# Patient Record
Sex: MALE | Race: WHITE | Employment: OTHER | URBAN - METROPOLITAN AREA
[De-identification: names, ages, dates, MRNs, and addresses within clinical notes are randomized per-mention and may not be internally consistent; named-entity substitution may affect disease eponyms.]

---

## 2018-03-07 ENCOUNTER — OFFICE VISIT (OUTPATIENT)
Dept: FAMILY MEDICINE CLINIC | Age: 58
End: 2018-03-07

## 2018-03-07 ENCOUNTER — HOSPITAL ENCOUNTER (OUTPATIENT)
Dept: GENERAL RADIOLOGY | Age: 58
Discharge: HOME | End: 2018-03-07
Attending: NURSE PRACTITIONER | Admitting: NURSE PRACTITIONER

## 2018-03-07 VITALS
HEIGHT: 73 IN | HEART RATE: 76 BPM | BODY MASS INDEX: 41.75 KG/M2 | WEIGHT: 315 LBS | OXYGEN SATURATION: 96 % | DIASTOLIC BLOOD PRESSURE: 84 MMHG | SYSTOLIC BLOOD PRESSURE: 138 MMHG | RESPIRATION RATE: 17 BRPM

## 2018-03-07 DIAGNOSIS — M79.604 RIGHT LEG PAIN: Primary | ICD-10-CM

## 2018-03-07 DIAGNOSIS — Z23 IMMUNIZATION DUE: ICD-10-CM

## 2018-03-07 DIAGNOSIS — H00.019 HORDEOLUM EXTERNUM, UNSPECIFIED LATERALITY: ICD-10-CM

## 2018-03-07 DIAGNOSIS — M79.604 RIGHT LEG PAIN: ICD-10-CM

## 2018-03-07 PROCEDURE — G8417 CALC BMI ABV UP PARAM F/U: HCPCS | Performed by: NURSE PRACTITIONER

## 2018-03-07 PROCEDURE — G8510 SCR DEP NEG, NO PLAN REQD: HCPCS | Performed by: NURSE PRACTITIONER

## 2018-03-07 PROCEDURE — G8427 DOCREV CUR MEDS BY ELIG CLIN: HCPCS | Performed by: NURSE PRACTITIONER

## 2018-03-07 PROCEDURE — G8484 FLU IMMUNIZE NO ADMIN: HCPCS | Performed by: NURSE PRACTITIONER

## 2018-03-07 PROCEDURE — 3017F COLORECTAL CA SCREEN DOC REV: CPT | Performed by: NURSE PRACTITIONER

## 2018-03-07 PROCEDURE — 90715 TDAP VACCINE 7 YRS/> IM: CPT | Performed by: NURSE PRACTITIONER

## 2018-03-07 PROCEDURE — 90471 IMMUNIZATION ADMIN: CPT | Performed by: NURSE PRACTITIONER

## 2018-03-07 PROCEDURE — 1036F TOBACCO NON-USER: CPT | Performed by: NURSE PRACTITIONER

## 2018-03-07 PROCEDURE — 99214 OFFICE O/P EST MOD 30 MIN: CPT | Performed by: NURSE PRACTITIONER

## 2018-03-07 RX ORDER — BACITRACIN ZINC AND POLYMYXIN B SULFATE 500; 10000 [USP'U]/G; [USP'U]/G
0.5 OINTMENT OPHTHALMIC 2 TIMES DAILY
Qty: 3.5 G | Refills: 0 | Status: SHIPPED | OUTPATIENT
Start: 2018-03-07 | End: 2018-03-17

## 2018-03-07 ASSESSMENT — ENCOUNTER SYMPTOMS
RESPIRATORY NEGATIVE: 1
PHOTOPHOBIA: 1
EYE REDNESS: 1
EYE PAIN: 1
EYE ITCHING: 1

## 2018-03-07 ASSESSMENT — PATIENT HEALTH QUESTIONNAIRE - PHQ9
1. LITTLE INTEREST OR PLEASURE IN DOING THINGS: 0
SUM OF ALL RESPONSES TO PHQ9 QUESTIONS 1 & 2: 0
2. FEELING DOWN, DEPRESSED OR HOPELESS: 0
SUM OF ALL RESPONSES TO PHQ QUESTIONS 1-9: 0

## 2018-03-07 NOTE — PATIENT INSTRUCTIONS
We are committed to providing you the best care possible. If you receive a survey after visiting one of our offices, please take time to share your experience concerning your physician office visit. These surveys are confidential and no health information about you is shared. We are eager to improve for you and we are counting on your feedback to help make that happen.         You have an order to get an xray of your lower right leg; we will call you with the results    Keep your leg wound clean and dry    Ace wrap may help with swelling, use otc aleve as needed for comfort, ice and elevation for reduction of swelling and decreased pain    Your medication has been sent to the pharmacy; please return for new or worsening symptoms or any concerns as needed

## 2018-03-27 ENCOUNTER — OFFICE VISIT (OUTPATIENT)
Dept: FAMILY MEDICINE CLINIC | Age: 58
End: 2018-03-27

## 2018-03-27 VITALS
BODY MASS INDEX: 41.75 KG/M2 | OXYGEN SATURATION: 94 % | SYSTOLIC BLOOD PRESSURE: 140 MMHG | DIASTOLIC BLOOD PRESSURE: 100 MMHG | RESPIRATION RATE: 14 BRPM | WEIGHT: 315 LBS | HEIGHT: 73 IN | HEART RATE: 86 BPM

## 2018-03-27 DIAGNOSIS — E66.01 MORBID OBESITY WITH BMI OF 40.0-44.9, ADULT (HCC): ICD-10-CM

## 2018-03-27 DIAGNOSIS — F51.04 PSYCHOPHYSIOLOGICAL INSOMNIA: ICD-10-CM

## 2018-03-27 DIAGNOSIS — I10 ESSENTIAL HYPERTENSION: Primary | ICD-10-CM

## 2018-03-27 DIAGNOSIS — R73.09 ABNORMAL GLUCOSE: ICD-10-CM

## 2018-03-27 DIAGNOSIS — J98.01 POST-INFECTION BRONCHOSPASM: ICD-10-CM

## 2018-03-27 LAB
A/G RATIO: 1.4 (ref 1.1–2.2)
ALBUMIN SERPL-MCNC: 4.3 G/DL (ref 3.4–5)
ALP BLD-CCNC: 80 U/L (ref 40–129)
ALT SERPL-CCNC: 34 U/L (ref 10–40)
ANION GAP SERPL CALCULATED.3IONS-SCNC: 15 MMOL/L (ref 3–16)
AST SERPL-CCNC: 26 U/L (ref 15–37)
BILIRUB SERPL-MCNC: 0.5 MG/DL (ref 0–1)
BUN BLDV-MCNC: 10 MG/DL (ref 7–20)
CALCIUM SERPL-MCNC: 8.6 MG/DL (ref 8.3–10.6)
CHLORIDE BLD-SCNC: 103 MMOL/L (ref 99–110)
CHOLESTEROL, TOTAL: 119 MG/DL (ref 0–199)
CO2: 24 MMOL/L (ref 21–32)
CREAT SERPL-MCNC: 0.6 MG/DL (ref 0.9–1.3)
GFR AFRICAN AMERICAN: >60
GFR NON-AFRICAN AMERICAN: >60
GLOBULIN: 3 G/DL
GLUCOSE BLD-MCNC: 94 MG/DL (ref 70–99)
HCT VFR BLD CALC: 42 % (ref 40.5–52.5)
HDLC SERPL-MCNC: 46 MG/DL (ref 40–60)
HEMOGLOBIN: 14.5 G/DL (ref 13.5–17.5)
LDL CHOLESTEROL CALCULATED: 40 MG/DL
MCH RBC QN AUTO: 31.2 PG (ref 26–34)
MCHC RBC AUTO-ENTMCNC: 34.6 G/DL (ref 31–36)
MCV RBC AUTO: 90.4 FL (ref 80–100)
PDW BLD-RTO: 13.6 % (ref 12.4–15.4)
PLATELET # BLD: 246 K/UL (ref 135–450)
PMV BLD AUTO: 7.8 FL (ref 5–10.5)
POTASSIUM SERPL-SCNC: 4.2 MMOL/L (ref 3.5–5.1)
RBC # BLD: 4.65 M/UL (ref 4.2–5.9)
SODIUM BLD-SCNC: 142 MMOL/L (ref 136–145)
TOTAL PROTEIN: 7.3 G/DL (ref 6.4–8.2)
TRIGL SERPL-MCNC: 163 MG/DL (ref 0–150)
TSH SERPL DL<=0.05 MIU/L-ACNC: 2.34 UIU/ML (ref 0.27–4.2)
VLDLC SERPL CALC-MCNC: 33 MG/DL
WBC # BLD: 7 K/UL (ref 4–11)

## 2018-03-27 PROCEDURE — G8427 DOCREV CUR MEDS BY ELIG CLIN: HCPCS | Performed by: FAMILY MEDICINE

## 2018-03-27 PROCEDURE — G8417 CALC BMI ABV UP PARAM F/U: HCPCS | Performed by: FAMILY MEDICINE

## 2018-03-27 PROCEDURE — 36415 COLL VENOUS BLD VENIPUNCTURE: CPT | Performed by: FAMILY MEDICINE

## 2018-03-27 PROCEDURE — 1036F TOBACCO NON-USER: CPT | Performed by: FAMILY MEDICINE

## 2018-03-27 PROCEDURE — 3017F COLORECTAL CA SCREEN DOC REV: CPT | Performed by: FAMILY MEDICINE

## 2018-03-27 PROCEDURE — G8484 FLU IMMUNIZE NO ADMIN: HCPCS | Performed by: FAMILY MEDICINE

## 2018-03-27 PROCEDURE — 99214 OFFICE O/P EST MOD 30 MIN: CPT | Performed by: FAMILY MEDICINE

## 2018-03-27 RX ORDER — LISINOPRIL 2.5 MG/1
2.5 TABLET ORAL NIGHTLY
Qty: 30 TABLET | Refills: 3 | Status: SHIPPED | OUTPATIENT
Start: 2018-03-27 | End: 2018-05-08 | Stop reason: SDUPTHER

## 2018-03-27 NOTE — PROGRESS NOTES
03/27/18 (!) 140/100   03/07/18 138/84   12/12/16 134/88     Wt Readings from Last 3 Encounters:   03/27/18 (!) 319 lb (144.7 kg)   03/07/18 (!) 331 lb 12.8 oz (150.5 kg)   12/12/16 299 lb (135.6 kg)     Constitutional: He is oriented to person, place, and time. He appears well-developed and well-nourished. No distress. morbid  obese habitus. Wearing dark glasses. Here with wife. Uses cane  HENT: wearing bilateral hearing aids and   Head: Normocephalic and atraumatic. Nose: Nose normal.  Mouth/Throat: Oropharynx is clear and moist.   Neck: Trachea normal. Neck supple. Cardiovascular: Normal rate, regular rhythm, S1 normal, S2 normal and normal heart sounds. No murmur heard. Pulmonary/Chest: Breath sounds normal. No accessory muscle usage. No respiratory distress. He  has no decreased breath sounds. He  has no wheezes. Abdominal: Soft. Normal appearance and bowel sounds are normal. . No tenderness. He has no CVA tenderness. Neurological: He  is alert and oriented to person, place, and time. Skin: He  is not diaphoretic. Psychiatric: He  has a normal mood and affect. MSK: trace non pitting edema to both shins.      Assessment and Plan:  See above

## 2018-03-28 LAB
ESTIMATED AVERAGE GLUCOSE: 108.3 MG/DL
HBA1C MFR BLD: 5.4 %

## 2018-05-08 ENCOUNTER — OFFICE VISIT (OUTPATIENT)
Dept: FAMILY MEDICINE CLINIC | Age: 58
End: 2018-05-08

## 2018-05-08 VITALS
OXYGEN SATURATION: 95 % | SYSTOLIC BLOOD PRESSURE: 138 MMHG | WEIGHT: 315 LBS | HEIGHT: 73 IN | DIASTOLIC BLOOD PRESSURE: 86 MMHG | HEART RATE: 77 BPM | RESPIRATION RATE: 16 BRPM | BODY MASS INDEX: 41.75 KG/M2

## 2018-05-08 DIAGNOSIS — R45.4 IRRITABILITY: ICD-10-CM

## 2018-05-08 DIAGNOSIS — I10 ESSENTIAL HYPERTENSION: Primary | ICD-10-CM

## 2018-05-08 PROCEDURE — 3017F COLORECTAL CA SCREEN DOC REV: CPT | Performed by: FAMILY MEDICINE

## 2018-05-08 PROCEDURE — 99213 OFFICE O/P EST LOW 20 MIN: CPT | Performed by: FAMILY MEDICINE

## 2018-05-08 PROCEDURE — 1036F TOBACCO NON-USER: CPT | Performed by: FAMILY MEDICINE

## 2018-05-08 PROCEDURE — G8417 CALC BMI ABV UP PARAM F/U: HCPCS | Performed by: FAMILY MEDICINE

## 2018-05-08 PROCEDURE — G8427 DOCREV CUR MEDS BY ELIG CLIN: HCPCS | Performed by: FAMILY MEDICINE

## 2018-05-08 RX ORDER — LISINOPRIL 2.5 MG/1
2.5 TABLET ORAL DAILY
Qty: 90 TABLET | Refills: 3 | Status: SHIPPED | OUTPATIENT
Start: 2018-05-08 | End: 2018-08-07 | Stop reason: SDUPTHER

## 2018-05-08 RX ORDER — ESCITALOPRAM OXALATE 5 MG/1
5 TABLET ORAL DAILY
Qty: 90 TABLET | Refills: 1 | Status: SHIPPED | OUTPATIENT
Start: 2018-05-08 | End: 2018-08-07 | Stop reason: SDUPTHER

## 2018-06-08 ENCOUNTER — TELEPHONE (OUTPATIENT)
Dept: FAMILY MEDICINE CLINIC | Age: 58
End: 2018-06-08

## 2018-08-07 ENCOUNTER — OFFICE VISIT (OUTPATIENT)
Dept: FAMILY MEDICINE CLINIC | Age: 58
End: 2018-08-07

## 2018-08-07 VITALS
BODY MASS INDEX: 42.09 KG/M2 | HEART RATE: 82 BPM | SYSTOLIC BLOOD PRESSURE: 138 MMHG | DIASTOLIC BLOOD PRESSURE: 100 MMHG | WEIGHT: 315 LBS | OXYGEN SATURATION: 98 %

## 2018-08-07 DIAGNOSIS — R68.82 DECREASED LIBIDO: ICD-10-CM

## 2018-08-07 DIAGNOSIS — I10 ESSENTIAL HYPERTENSION: Primary | ICD-10-CM

## 2018-08-07 DIAGNOSIS — R45.4 IRRITABILITY: ICD-10-CM

## 2018-08-07 PROCEDURE — 3017F COLORECTAL CA SCREEN DOC REV: CPT | Performed by: FAMILY MEDICINE

## 2018-08-07 PROCEDURE — 1036F TOBACCO NON-USER: CPT | Performed by: FAMILY MEDICINE

## 2018-08-07 PROCEDURE — G8417 CALC BMI ABV UP PARAM F/U: HCPCS | Performed by: FAMILY MEDICINE

## 2018-08-07 PROCEDURE — G8427 DOCREV CUR MEDS BY ELIG CLIN: HCPCS | Performed by: FAMILY MEDICINE

## 2018-08-07 PROCEDURE — 99213 OFFICE O/P EST LOW 20 MIN: CPT | Performed by: FAMILY MEDICINE

## 2018-08-07 RX ORDER — LISINOPRIL 10 MG/1
10 TABLET ORAL DAILY
Qty: 90 TABLET | Refills: 1 | Status: SHIPPED | OUTPATIENT
Start: 2018-08-07 | End: 2019-01-14 | Stop reason: SDUPTHER

## 2018-08-07 RX ORDER — ESCITALOPRAM OXALATE 10 MG/1
10 TABLET ORAL DAILY
Qty: 90 TABLET | Refills: 1 | Status: SHIPPED | OUTPATIENT
Start: 2018-08-07 | End: 2019-01-14 | Stop reason: SDUPTHER

## 2018-08-07 NOTE — PROGRESS NOTES
Plan:   Increase lisinopril to 10mg  Increase lexapro to 5 mg  Patient declines med for ED at this time         Diagnosis Assessment and Associated Orders:     Diagnosis Orders   1. Essential hypertension  lisinopril (PRINIVIL;ZESTRIL) 10 MG tablet   2. Irritability  escitalopram (LEXAPRO) 10 MG tablet   3. Decreased libido          All patient questions answered. Pt voiced understanding. Return in about 3 months (around 11/7/2018) for f/u moods and blood pressure.  -----------------------------------------------------------------------------------------------            Chief Complaint   Patient presents with    3 Month Follow-Up     Wife states he has been having issues with twitching, states that it happens during the night or when he is relaxed completely. He ias been doing lots of physical work moving furniture and reports symptoms started after that activity. Has improved over the last few days. No other tremors reported. Blood pressure is still high at home and tolerating lisinopril without side effects. Patient denies any exertional chest pain, dyspnea, palpitations, syncope, orthopnea, edema or paroxysmal nocturnal dyspnea. The patient denies cough, chest pain, dyspnea, wheezing or hemoptysis. Moods improved with some mild irritability and still grieving the loss of his father earlier this year. + side effects with decreased libido since starting the lexparo. Denies any suicidial or homicidal ideation or hallucinations. PHQ Scores 3/7/2018   PHQ2 Score 0   PHQ9 Score 0     Interpretation of Total Score Depression Severity: 1-4 = Minimal depression, 5-9 = Mild depression, 10-14 = Moderate depression, 15-19 = Moderately severe depression, 20-27 = Severe depression      ROS: Pertinent items are noted in HPI. All other ROS negative     reports that he has never smoked. He has never used smokeless tobacco.   reports that he drinks alcohol.     Physical Exam   Nursing note reviewed  BP (!)

## 2019-01-14 ENCOUNTER — OFFICE VISIT (OUTPATIENT)
Dept: FAMILY MEDICINE CLINIC | Age: 59
End: 2019-01-14
Payer: MEDICARE

## 2019-01-14 VITALS
OXYGEN SATURATION: 98 % | HEIGHT: 73 IN | DIASTOLIC BLOOD PRESSURE: 86 MMHG | BODY MASS INDEX: 41.75 KG/M2 | WEIGHT: 315 LBS | RESPIRATION RATE: 14 BRPM | HEART RATE: 83 BPM | SYSTOLIC BLOOD PRESSURE: 134 MMHG

## 2019-01-14 DIAGNOSIS — H54.8 LEGAL BLINDNESS: ICD-10-CM

## 2019-01-14 DIAGNOSIS — E66.01 MORBID OBESITY WITH BMI OF 40.0-44.9, ADULT (HCC): ICD-10-CM

## 2019-01-14 DIAGNOSIS — R45.4 IRRITABILITY: ICD-10-CM

## 2019-01-14 DIAGNOSIS — I10 ESSENTIAL HYPERTENSION: ICD-10-CM

## 2019-01-14 DIAGNOSIS — Z78.9 ALCOHOL USE: ICD-10-CM

## 2019-01-14 DIAGNOSIS — H35.52 RETINITIS PIGMENTOSA: ICD-10-CM

## 2019-01-14 DIAGNOSIS — M54.2 MUSCLE PAIN, CERVICAL: Primary | ICD-10-CM

## 2019-01-14 PROBLEM — F10.90 ALCOHOL USE: Status: ACTIVE | Noted: 2019-01-14

## 2019-01-14 PROCEDURE — 99214 OFFICE O/P EST MOD 30 MIN: CPT | Performed by: FAMILY MEDICINE

## 2019-01-14 RX ORDER — TIZANIDINE 4 MG/1
4 TABLET ORAL NIGHTLY PRN
Qty: 40 TABLET | Refills: 1 | Status: SHIPPED | OUTPATIENT
Start: 2019-01-14 | End: 2022-01-26 | Stop reason: SDUPTHER

## 2019-01-14 RX ORDER — LISINOPRIL 10 MG/1
10 TABLET ORAL DAILY
Qty: 90 TABLET | Refills: 1 | Status: SHIPPED | OUTPATIENT
Start: 2019-01-14 | End: 2019-10-31 | Stop reason: SDUPTHER

## 2019-01-14 RX ORDER — ESCITALOPRAM OXALATE 10 MG/1
10 TABLET ORAL DAILY
Qty: 90 TABLET | Refills: 1 | Status: SHIPPED | OUTPATIENT
Start: 2019-01-14 | End: 2020-02-04 | Stop reason: SDUPTHER

## 2019-03-19 ENCOUNTER — TELEPHONE (OUTPATIENT)
Dept: FAMILY MEDICINE CLINIC | Age: 59
End: 2019-03-19

## 2019-10-31 ENCOUNTER — OFFICE VISIT (OUTPATIENT)
Dept: FAMILY MEDICINE CLINIC | Age: 59
End: 2019-10-31
Payer: MEDICARE

## 2019-10-31 VITALS
BODY MASS INDEX: 41.64 KG/M2 | OXYGEN SATURATION: 97 % | HEIGHT: 73 IN | DIASTOLIC BLOOD PRESSURE: 84 MMHG | RESPIRATION RATE: 16 BRPM | SYSTOLIC BLOOD PRESSURE: 136 MMHG | HEART RATE: 88 BPM | WEIGHT: 314.2 LBS

## 2019-10-31 DIAGNOSIS — R45.4 IRRITABILITY: ICD-10-CM

## 2019-10-31 DIAGNOSIS — M54.10 RADICULAR SYNDROME OF RIGHT LOWER EXTREMITY: ICD-10-CM

## 2019-10-31 DIAGNOSIS — I10 ESSENTIAL HYPERTENSION: Primary | ICD-10-CM

## 2019-10-31 DIAGNOSIS — Z78.9 ALCOHOL USE: ICD-10-CM

## 2019-10-31 PROCEDURE — 99214 OFFICE O/P EST MOD 30 MIN: CPT | Performed by: FAMILY MEDICINE

## 2019-10-31 PROCEDURE — 36415 COLL VENOUS BLD VENIPUNCTURE: CPT | Performed by: FAMILY MEDICINE

## 2019-10-31 RX ORDER — LISINOPRIL 10 MG/1
10 TABLET ORAL DAILY
Qty: 90 TABLET | Refills: 3 | Status: SHIPPED | OUTPATIENT
Start: 2019-10-31 | End: 2020-02-04 | Stop reason: SDUPTHER

## 2019-10-31 RX ORDER — BLOOD PRESSURE TEST KIT
KIT MISCELLANEOUS
Qty: 1 KIT | Refills: 0 | Status: SHIPPED | OUTPATIENT
Start: 2019-10-31

## 2019-10-31 ASSESSMENT — PATIENT HEALTH QUESTIONNAIRE - PHQ9
1. LITTLE INTEREST OR PLEASURE IN DOING THINGS: 0
SUM OF ALL RESPONSES TO PHQ QUESTIONS 1-9: 0
SUM OF ALL RESPONSES TO PHQ QUESTIONS 1-9: 0
SUM OF ALL RESPONSES TO PHQ9 QUESTIONS 1 & 2: 0
2. FEELING DOWN, DEPRESSED OR HOPELESS: 0

## 2019-11-01 LAB
A/G RATIO: 1.6 (ref 1.1–2.2)
ALBUMIN SERPL-MCNC: 4.4 G/DL (ref 3.4–5)
ALP BLD-CCNC: 75 U/L (ref 40–129)
ALT SERPL-CCNC: 24 U/L (ref 10–40)
ANION GAP SERPL CALCULATED.3IONS-SCNC: 18 MMOL/L (ref 3–16)
AST SERPL-CCNC: 19 U/L (ref 15–37)
BILIRUB SERPL-MCNC: 0.5 MG/DL (ref 0–1)
BUN BLDV-MCNC: 18 MG/DL (ref 7–20)
CALCIUM SERPL-MCNC: 8.9 MG/DL (ref 8.3–10.6)
CHLORIDE BLD-SCNC: 102 MMOL/L (ref 99–110)
CO2: 21 MMOL/L (ref 21–32)
CREAT SERPL-MCNC: 0.8 MG/DL (ref 0.9–1.3)
GFR AFRICAN AMERICAN: >60
GFR NON-AFRICAN AMERICAN: >60
GLOBULIN: 2.8 G/DL
GLUCOSE BLD-MCNC: 92 MG/DL (ref 70–99)
HCT VFR BLD CALC: 43 % (ref 40.5–52.5)
HEMOGLOBIN: 14.2 G/DL (ref 13.5–17.5)
MCH RBC QN AUTO: 32 PG (ref 26–34)
MCHC RBC AUTO-ENTMCNC: 33.1 G/DL (ref 31–36)
MCV RBC AUTO: 96.7 FL (ref 80–100)
PDW BLD-RTO: 13.5 % (ref 12.4–15.4)
PLATELET # BLD: 189 K/UL (ref 135–450)
PMV BLD AUTO: 8 FL (ref 5–10.5)
POTASSIUM SERPL-SCNC: 4.4 MMOL/L (ref 3.5–5.1)
RBC # BLD: 4.45 M/UL (ref 4.2–5.9)
SODIUM BLD-SCNC: 141 MMOL/L (ref 136–145)
TOTAL PROTEIN: 7.2 G/DL (ref 6.4–8.2)
WBC # BLD: 7.4 K/UL (ref 4–11)

## 2020-02-03 NOTE — PROGRESS NOTES
non antalgic gati     Assessment and Plan:  See above     Diagnosis Orders   1. Essential hypertension - improved. continue lisinopril and home monitoring. lisinopril (PRINIVIL;ZESTRIL) 10 MG tablet   2. Irritability - improved with lexapro  escitalopram (LEXAPRO) 10 MG tablet   3. Screen for colon cancer Patient was given FIT testing instructions and kit today by staff. The patient understands the implications of the testing to screen for colorectal cancer and agree to do the testing in a timely manner and will consider getting the full colonoscopy if the FIT testing is positive or develop any GI/bowel symptoms.     POCT Fecal Immunochemical Test (FIT)     Return in about 6 months (around 8/4/2020) for Med refills- moods, HTN .        ----------------------------------------------------------------------------

## 2020-02-04 ENCOUNTER — OFFICE VISIT (OUTPATIENT)
Dept: FAMILY MEDICINE CLINIC | Age: 60
End: 2020-02-04
Payer: MEDICARE

## 2020-02-04 VITALS
DIASTOLIC BLOOD PRESSURE: 86 MMHG | WEIGHT: 315 LBS | BODY MASS INDEX: 41.75 KG/M2 | RESPIRATION RATE: 16 BRPM | HEIGHT: 73 IN | HEART RATE: 87 BPM | SYSTOLIC BLOOD PRESSURE: 136 MMHG | OXYGEN SATURATION: 98 %

## 2020-02-04 PROCEDURE — 99213 OFFICE O/P EST LOW 20 MIN: CPT | Performed by: FAMILY MEDICINE

## 2020-02-04 RX ORDER — ESCITALOPRAM OXALATE 10 MG/1
10 TABLET ORAL DAILY
Qty: 90 TABLET | Refills: 3 | Status: SHIPPED | OUTPATIENT
Start: 2020-02-04 | End: 2020-10-20

## 2020-02-04 RX ORDER — LISINOPRIL 10 MG/1
10 TABLET ORAL DAILY
Qty: 90 TABLET | Refills: 3 | Status: SHIPPED | OUTPATIENT
Start: 2020-02-04 | End: 2021-03-16

## 2020-02-04 ASSESSMENT — PATIENT HEALTH QUESTIONNAIRE - PHQ9
SUM OF ALL RESPONSES TO PHQ QUESTIONS 1-9: 0
1. LITTLE INTEREST OR PLEASURE IN DOING THINGS: 0
2. FEELING DOWN, DEPRESSED OR HOPELESS: 0
SUM OF ALL RESPONSES TO PHQ9 QUESTIONS 1 & 2: 0
SUM OF ALL RESPONSES TO PHQ QUESTIONS 1-9: 0

## 2020-02-04 NOTE — LETTER
February 4, 2020     Patient: Susan Barrera   YOB: 1960   Date of Visit: 2/4/2020       To Whom It May Concern: It is my medical opinion that Shravan Altman requires a constant caregiver to monitor his current medical conditions. He has ongoing chronic severe vision issues that require regular monitoring. .    If you have any questions or concerns, please don't hesitate to call. Sincerely,          Prasanth Neal M.D., M.P.H  [address] [place] [address] [address]  [facility], [address] [facility]  Phone: [phone]  Fax: [phone]

## 2020-03-25 ENCOUNTER — TELEPHONE (OUTPATIENT)
Dept: FAMILY MEDICINE CLINIC | Age: 60
End: 2020-03-25

## 2020-03-27 RX ORDER — HYDROXYZINE HYDROCHLORIDE 10 MG/1
10 TABLET, FILM COATED ORAL EVERY 8 HOURS PRN
Qty: 30 TABLET | Refills: 1 | Status: SHIPPED | OUTPATIENT
Start: 2020-03-27 | End: 2020-04-06

## 2020-03-27 NOTE — TELEPHONE ENCOUNTER
notify that doubling his dose may not help as it takes 2 weeks to increase the level in the system. I sent to his [facility], hydroxyzine tablets to use for anxiety flares. It can cause drowsiness and dry mouth as it is stronger version of over the counter benadryl. There is no addiction or withdrawal risk with this medication.

## 2020-08-04 ENCOUNTER — VIRTUAL VISIT (OUTPATIENT)
Dept: FAMILY MEDICINE CLINIC | Age: 60
End: 2020-08-04
Payer: MEDICARE

## 2020-08-04 PROCEDURE — 99442 PR PHYS/QHP TELEPHONE EVALUATION 11-20 MIN: CPT | Performed by: FAMILY MEDICINE

## 2020-08-13 ENCOUNTER — TELEPHONE (OUTPATIENT)
Dept: FAMILY MEDICINE CLINIC | Age: 60
End: 2020-08-13

## 2020-10-20 ENCOUNTER — VIRTUAL VISIT (OUTPATIENT)
Dept: FAMILY MEDICINE CLINIC | Age: 60
End: 2020-10-20
Payer: MEDICARE

## 2020-10-20 PROBLEM — F12.90 MARIJUANA USE: Status: ACTIVE | Noted: 2020-10-20

## 2020-10-20 PROBLEM — F41.9 ANXIETY: Status: ACTIVE | Noted: 2020-10-20

## 2020-10-20 PROCEDURE — 99442 PR PHYS/QHP TELEPHONE EVALUATION 11-20 MIN: CPT | Performed by: FAMILY MEDICINE

## 2020-10-20 NOTE — PROGRESS NOTES
Prudy Opitz is a 64 y.o. male evaluated via telephone on 10/20/2020. Consent:  He and/or health care decision maker is aware that that he may receive a bill for this telephone service, depending on his insurance coverage, and has provided verbal consent to proceed: Yes      Documentation:  I communicated with the patient and/or health care decision maker about     1. Anxiety- patient reports that lexapro 10 mg may not be working as well as it used to. Being anxious being out of the house and had severe anxiety when being out with visiting friends a few weeks ago. Patient wanting to possibly use medical marijuana as he tried his friends supply. Denies any suicidial or homicidal ideation or hallucinations. Encouraged patient to keep cut down on alcohol use to help with anxiety. Details of this discussion including any medical advice provided:     1. Patient has already stopped lexapro 3 weeks ago    2. He will plan to go to medical marijuana clinic to get his medical card to use marijuana for his anxiety instead. 3. Counseled on quitting alcohol. 4.  Patient to keep Andrey appt as scheduled. Diagnosis Orders   1. Anxiety     2. Marijuana use         I affirm this is a Patient Initiated Episode with a Patient who has not had a related appointment within my department in the past 7 days or scheduled within the next 24 hours. Diagnosis Orders   1. Anxiety     2.  Marijuana use         Patient identification was verified at the start of the visit: Yes    Total Time: minutes: 11-20 minutes    Note: not billable if this call serves to triage the patient into an appointment for the relevant concern      [facility]

## 2021-03-13 DIAGNOSIS — I10 ESSENTIAL HYPERTENSION: ICD-10-CM

## 2021-03-16 RX ORDER — LISINOPRIL 10 MG/1
10 TABLET ORAL DAILY
Qty: 90 TABLET | Refills: 0 | Status: SHIPPED | OUTPATIENT
Start: 2021-03-16

## 2022-01-20 ENCOUNTER — VIRTUAL VISIT (OUTPATIENT)
Dept: FAMILY MEDICINE CLINIC | Age: 62
End: 2022-01-20
Payer: MEDICARE

## 2022-01-20 DIAGNOSIS — I10 ESSENTIAL HYPERTENSION: ICD-10-CM

## 2022-01-20 DIAGNOSIS — E78.2 MIXED HYPERLIPIDEMIA: ICD-10-CM

## 2022-01-20 DIAGNOSIS — J02.0 ACUTE STREPTOCOCCAL PHARYNGITIS: Primary | ICD-10-CM

## 2022-01-20 PROCEDURE — 99214 OFFICE O/P EST MOD 30 MIN: CPT | Performed by: NURSE PRACTITIONER

## 2022-01-20 RX ORDER — LISINOPRIL 10 MG/1
10 TABLET ORAL DAILY
Qty: 90 TABLET | Refills: 0 | Status: CANCELLED | OUTPATIENT
Start: 2022-01-20

## 2022-01-20 RX ORDER — AMOXICILLIN 500 MG/1
500 CAPSULE ORAL 2 TIMES DAILY
Qty: 20 CAPSULE | Refills: 0 | Status: SHIPPED | OUTPATIENT
Start: 2022-01-20 | End: 2022-01-30

## 2022-01-25 ENCOUNTER — TELEPHONE (OUTPATIENT)
Dept: FAMILY MEDICINE CLINIC | Age: 62
End: 2022-01-25

## 2022-01-25 DIAGNOSIS — R39.12 WEAK URINARY STREAM: Primary | ICD-10-CM

## 2022-01-25 DIAGNOSIS — M54.2 MUSCLE PAIN, CERVICAL: ICD-10-CM

## 2022-01-25 NOTE — TELEPHONE ENCOUNTER
----- Message from Santana  sent at 1/25/2022  9:52 AM EST -----  Subject: Message to Provider    QUESTIONS  Information for Provider? Pt wife called in and stated he is having issues   with urinating. She says that when he goes to the bathroom, a very little   amount will come out each time. She didn't know if he needed to be seen   for this or if Dr. Papo Paz wanted to order a urine test. Please advise.  ---------------------------------------------------------------------------  --------------  CALL BACK INFO  What is the best way for the office to contact you? OK to leave message on   voicemail  Preferred Call Back Phone Number? [phone]  ---------------------------------------------------------------------------  --------------  SCRIPT ANSWERS  Relationship to Patient? Other  Representative Name? Malu Dia  Is the Representative on the appropriate HIPAA document in Epic?  Yes

## 2022-01-26 RX ORDER — TAMSULOSIN HYDROCHLORIDE 0.4 MG/1
0.4 CAPSULE ORAL
Qty: 30 CAPSULE | Refills: 5 | Status: SHIPPED | OUTPATIENT
Start: 2022-01-26

## 2022-01-26 RX ORDER — TIZANIDINE 4 MG/1
4 TABLET ORAL NIGHTLY PRN
Qty: 40 TABLET | Refills: 1 | Status: SHIPPED | OUTPATIENT
Start: 2022-01-26

## 2022-01-26 NOTE — TELEPHONE ENCOUNTER
Please notify patient should  1. Limit ibuprofen 200mg tablets-  3 tablets with food every 12 hours as needed. 2. Can add tyelnol 500mg or 650mg tablets- 2 tablet every 8 hours with food. 3. I sent refill of muscle relaxer if he thinks the headaches are due to neck spasms or upper back spasms.

## 2022-01-26 NOTE — TELEPHONE ENCOUNTER
Please notify patient to continue antibiotics given in ER last night for a UTI and I sent generic Flomax to help increase the urinary stream as he probably has an enlarged prostate.   He should use it for at least two weeks but also hydrate adequately with at least 50 ounces non caffienated fluid per day

## 2022-01-26 NOTE — TELEPHONE ENCOUNTER
Patient spouse called and stated that he is having headaches with no relief from 200's 2 daily iburphon, stated that he has been sleeping most of the day due to the headaches. Would like to know if she could up the dose or would provider like to prescribe some medication.

## 2022-04-05 ENCOUNTER — TELEPHONE (OUTPATIENT)
Dept: FAMILY MEDICINE CLINIC | Age: 62
End: 2022-04-05

## 2022-04-05 NOTE — TELEPHONE ENCOUNTER
Last appointment 1/20/22. Patient would like a script for a handicap placard. please advise, thank you.

## 2022-04-05 NOTE — LETTER
[facility]  [address] [place] [address] [facility]  Phone: [phone]  Fax: [phone]      April 5, 2022     Patient: Lluvia Ovalles   YOB: 1960   Date of Last Visit: 1/20/2022       To Whom It May Concern: It is my medical opinion that Brian Leger requires a disability parking placard for the following reasons:  He is blind. Duration of need: 2 years    If you have any questions or concerns, please don't hesitate to call. Sincerely,          Beth Antony M.D., M.P.H  [address] [place] [address].  [address] [place] [address] [address]  [facility], [address] [facility]  Phone: [phone]  Fax: [phone]

## 2022-10-03 ENCOUNTER — COMMUNITY OUTREACH (OUTPATIENT)
Dept: FAMILY MEDICINE CLINIC | Age: 62
End: 2022-10-03

## 2023-04-18 ENCOUNTER — TELEPHONE (OUTPATIENT)
Dept: FAMILY MEDICINE CLINIC | Age: 63
End: 2023-04-18

## 2023-07-11 PROBLEM — H54.10 BLINDNESS AND LOW VISION: Status: ACTIVE | Noted: 2023-07-11

## 2023-07-11 PROBLEM — H54.7 VISUAL IMPAIRMENT: Status: ACTIVE | Noted: 2023-07-11

## 2023-09-26 ENCOUNTER — OFFICE VISIT (OUTPATIENT)
Dept: FAMILY MEDICINE CLINIC | Age: 63
End: 2023-09-26
Payer: MEDICARE

## 2023-09-26 VITALS
OXYGEN SATURATION: 96 % | BODY MASS INDEX: 43.41 KG/M2 | DIASTOLIC BLOOD PRESSURE: 92 MMHG | SYSTOLIC BLOOD PRESSURE: 142 MMHG | HEART RATE: 94 BPM | WEIGHT: 315 LBS

## 2023-09-26 DIAGNOSIS — E88.810 METABOLIC SYNDROME: ICD-10-CM

## 2023-09-26 DIAGNOSIS — M47.26 OSTEOARTHRITIS OF SPINE WITH RADICULOPATHY, LUMBAR REGION: ICD-10-CM

## 2023-09-26 DIAGNOSIS — M54.16 LUMBAR RADICULOPATHY, CHRONIC: Primary | ICD-10-CM

## 2023-09-26 PROCEDURE — 99213 OFFICE O/P EST LOW 20 MIN: CPT | Performed by: FAMILY MEDICINE

## 2023-09-26 PROCEDURE — 3077F SYST BP >= 140 MM HG: CPT | Performed by: FAMILY MEDICINE

## 2023-09-26 PROCEDURE — 3080F DIAST BP >= 90 MM HG: CPT | Performed by: FAMILY MEDICINE

## 2023-09-26 RX ORDER — PREDNISONE 50 MG/1
50 TABLET ORAL DAILY
Qty: 5 TABLET | Refills: 0 | COMMUNITY
Start: 2023-09-24 | End: 2023-09-29

## 2023-09-26 RX ORDER — METHOCARBAMOL 500 MG/1
500 TABLET, FILM COATED ORAL 4 TIMES DAILY
Qty: 20 TABLET | Refills: 0 | COMMUNITY
Start: 2023-09-24 | End: 2023-09-29

## 2023-09-26 RX ORDER — MELOXICAM 15 MG/1
15 TABLET ORAL DAILY
Qty: 10 TABLET | Refills: 0 | COMMUNITY
Start: 2023-09-24 | End: 2023-09-26 | Stop reason: ALTCHOICE

## 2023-09-26 RX ORDER — LIDOCAINE 50 MG/G
1 PATCH TOPICAL
COMMUNITY
Start: 2023-09-24

## 2023-09-26 NOTE — PROGRESS NOTES
Plan:         Diagnosis Orders   1. Lumbar radiculopathy, chronic  [facility] [place]    MRI LUMBAR SPINE WO CONTRAST      2. Osteoarthritis of spine with radiculopathy, lumbar region  [address]      3. Metabolic syndrome  metFORMIN (GLUCOPHAGE) 500 MG tablet      Orders as above. Patient to stop nsaids due to other med conditions. Finish prednisone. Muscle relaxer prn. Consider cymbalta for long term. Trial metformin. Check labs in a few weeks. Low carb health eating to avoid any weight gain, which will worsen back pain. Referal pain specialist/ spine specialist based on insurance network and MRI results. All patient questions answered. Pt voiced understanding. Return for Keep upcoming appointment in this office. -----------------------------------------------------------------------------------------------            Chief Complaint   Patient presents with    ED Follow-up     Back pain pt reports some improvement but pain is still present. Pt seen in ER Sunday and reports was unable to walk due to the pain. pt describes pain as what he imagines it would feel like if he were shot in the back with a gun. Pt recalls no specific mechanism of injury, pt dose recall back pain issues began in May after a hiking trip. Painful daily and worse with prolong walking, sitting or standing without position changes. Still has tingling in back radiating to both legs and outer thighs, right worse than left. On prednisone, muscle relaxer and meloxicam from ER. Patient also would like med to help with weight loss. H/o prediabetes, low HDL and large waste circumference. + l;egally blind so physical activity is limited beyond regular ADL's wife supporting cooking, transporation, and housework, dressing          ROS: Pertinent items are noted in HPI. All other ROS negative     reports that he has never smoked.  He has never used smokeless tobacco.      Physical

## 2023-09-29 ENCOUNTER — HOSPITAL ENCOUNTER (OUTPATIENT)
Dept: MRI IMAGING | Age: 63
Discharge: HOME | End: 2023-09-29
Attending: FAMILY MEDICINE
Payer: MEDICARE

## 2023-09-29 ENCOUNTER — TELEPHONE (OUTPATIENT)
Dept: FAMILY MEDICINE CLINIC | Age: 63
End: 2023-09-29

## 2023-09-29 DIAGNOSIS — M54.16 LUMBAR RADICULOPATHY, CHRONIC: ICD-10-CM

## 2023-09-29 PROCEDURE — 72148 MRI LUMBAR SPINE W/O DYE: CPT

## 2023-09-29 NOTE — TELEPHONE ENCOUNTER
Omayra Cleary from [facility] , called and wanted to let the provider know that patient had his MRI completed. But at this time insurance will not approve due to that patient has not had a peer to peer. Reference #[id number], Insurance phone number .

## 2023-10-04 ENCOUNTER — TELEPHONE (OUTPATIENT)
Dept: FAMILY MEDICINE CLINIC | Age: 63
End: 2023-10-04

## 2023-10-04 DIAGNOSIS — M54.16 LUMBAR RADICULOPATHY, CHRONIC: Primary | ICD-10-CM

## 2023-10-05 DIAGNOSIS — R45.4 IRRITABILITY: ICD-10-CM

## 2023-10-05 RX ORDER — ESCITALOPRAM OXALATE 5 MG/1
5 TABLET ORAL DAILY
Qty: 90 TABLET | Refills: 1 | Status: SHIPPED | OUTPATIENT
Start: 2023-10-05

## 2023-10-05 RX ORDER — GABAPENTIN 100 MG/1
100 CAPSULE ORAL 3 TIMES DAILY PRN
Qty: 180 CAPSULE | Refills: 1 | Status: SHIPPED | OUTPATIENT
Start: 2023-10-05 | End: 2024-04-02

## 2023-10-06 NOTE — TELEPHONE ENCOUNTER
Left message to return call
Patient spouse called and wanted to if there is anything that the patient can take for his back pain. She stated that he has been using the tylenol and alternating heat and ice. Would like to know if there was something stronger that can be sent to pharmacy.
Patient spouse called and wanted to know if provider could please call in patient something for the back pain. Stated that the medication he received at hospital is not helping him at this time. [facility]. Please advise.
Please let wife know gabapentin sent to their [address] listed. He should start 1 capsule nightly to make sure he tolerates and can increase to 1 capsule three times per day or if helpful most in the evening can do 1 capsule morning and 2 capsules evening. We can keep increasing the dose after that but she should start there. He can continue to take tylenol 1000mg every 8 hrs in addition. His MRI shows significant arthritis/degenerative disc disease and building discs but no herniated disc.
Spoke with pt wife who gave voice understanding of results.
None

## 2023-10-11 ENCOUNTER — HOSPITAL ENCOUNTER (OUTPATIENT)
Dept: PHYSICAL THERAPY | Age: 63
Setting detail: THERAPIES SERIES
Discharge: HOME | End: 2023-10-11
Payer: MEDICARE

## 2023-10-11 PROCEDURE — 97162 PT EVAL MOD COMPLEX 30 MIN: CPT

## 2023-10-11 PROCEDURE — 97110 THERAPEUTIC EXERCISES: CPT

## 2023-10-11 PROCEDURE — 97535 SELF CARE MNGMENT TRAINING: CPT

## 2023-10-11 ASSESSMENT — PAIN DESCRIPTION - PAIN TYPE: TYPE: CHRONIC PAIN

## 2023-10-11 ASSESSMENT — PAIN SCALES - GENERAL: PAINLEVEL_OUTOF10: 3

## 2023-10-11 ASSESSMENT — PAIN DESCRIPTION - LOCATION: LOCATION: BACK;LEG

## 2023-10-11 ASSESSMENT — PAIN DESCRIPTION - ORIENTATION: ORIENTATION: RIGHT

## 2023-10-11 ASSESSMENT — PAIN DESCRIPTION - DESCRIPTORS: DESCRIPTORS: ACHING;DULL

## 2023-10-11 NOTE — PROGRESS NOTES
Physical Therapy: Initial Evaluation    Patient: Jo Diego (5 y.o. male)   Examination Date:   Plan of Care Certification Period: 10/11/2023 to        :  1960 ;    Confirmed: Yes MRN: [id number]  CSN: [id number]   Insurance: Payor: Marlyn Coffee / Plan: Laverna Shouts ESSENTIAL/PLUS / Product Type: *No Product type* /   Insurance ID: [id number] - (Medicare Managed) Secondary Insurance (if applicable):    Referring Physician: Deneen Fisher MD     PCP: Deneen Fisher MD Visits to Date/Visits Approved:   /   PRECERT   No Show/Cancelled Appts:   /       Medical Diagnosis: Lumbar radiculopathy, chronic [M54.16]  Osteoarthritis of spine with radiculopathy, lumbar region [M47.26] Lumbar radiculopathy, chronic  Treatment Diagnosis: back/leg pain, mobility deficits     PERTINENT MEDICAL HISTORY   Patient Assessed for Rehabilitation Services: Yes  Self reported health status[de-identified] Fair    Medical History: Chart Reviewed: Yes   Past Medical History:   Diagnosis Date    Essential hypertension     Hearing loss     Mixed hyperlipidemia     Obesity, Class III, BMI 40-49.9 (morbid obesity) ([address])     Gastric bypass    NADIR (obstructive sleep apnea)     unable to tolerate CPAP machine    Retinitis pigmentosa     no longer following with optho    Splenic cyst     seen on CT abdomen    Usher syndrome     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 Derrick Nguyễn at [facility]. Medications:   Current Outpatient Medications:     escitalopram (LEXAPRO) 5 MG tablet, take 1 tablet by mouth once daily, Disp: 90 tablet, Rfl: 1    gabapentin (NEURONTIN) 100 MG capsule, Take 1 capsule by mouth 3 times daily as needed (back pain) for up to 180 days.  Intended supply: 90 days, Disp: 180 capsule,

## 2023-10-11 NOTE — FLOWSHEET NOTE
Outpatient Physical Therapy  [place]           [x] Phone: [phone]   Fax: [phone]  [facility]           [] Phone: [phone]   Fax: [phone]        Physical Therapy Daily Treatment Note  Date:  10/11/2023    Patient Name:  Ashlyn Ching    :  1960  MRN: [id number]  Restrictions/Precautions: No data recorded   Position Activity Restriction  Other position/activity restrictions: Falls, blind and [place] d/t Usher's Syndrome  Diagnosis:   Lumbar radiculopathy, chronic [M54.16]  Osteoarthritis of spine with radiculopathy, lumbar region [M47.26] Diagnosis: Lumbar radiculopathy, chronic  Date of Injury/Surgery: chronic  Treatment Diagnosis:  back/leg pain, mobility deficits  Insurance/Certification information: [facility] Medicare - Precert  Referring Physician:  Kim Velasquez     PCP: Karolina Hutton MD  Next Doctor Visit:  unknown  Plan of care signed (Y/N):  pending  Outcome Measure:   Eval:  OSW 34/45 76% disability  Visit# / total visits:    approved by insurance (10/11/23 - 23)  Pain level: 3/10 Low back R>L, R hip, R anterolateral thigh  Goals:     Patient goals: function w/less pain, easier to walk     Long Term Goals  Time Frame for Long Term Goals: In 4 weeks, patient will  demonstrate compliance and SBA w/HEP (wife assist.)  score at <= 50% disability on OSW as indication of improved function w/daily activity. report no >= 4/10 back and leg symptoms/pain during waking hours. Summary of Evaluation:  Assessment: Pt is a 66 y.o. male w/dx OA spine, lumbar radiculopathy. Reports having had R thigh numbness approx 2 years and now is worse. Numb and burns. Symptoms have worsened and it feels like his back is going to give out w/legs giving out. Three weeks ago, attempted to stand from the bed and legs gave out/fell back onto the bed and needed help to get to a chair. Was taken to ER.   Reports his back hurt very bad, paramedics had to take him

## 2023-10-11 NOTE — PLAN OF CARE
[facility] PHYSICAL THERAPY  [facility]  [address]  [address]  Dept: [phone]  Dept Fax: [phone]  Loc: [phone]    PHYSICAL THERAPY PLAN OF CARE: INITIAL EVALUATION    Patient: Ricci Coker (5 y.o. male)   Examination Date:   Plan of Care Certification Period: 10/11/2023 to        :  1960  MRN: [id number]  CSN: [id number]   Insurance: Payor: Gabe Guerra / Plan: She Simms ESSENTIAL/PLUS / Product Type: *No Product type* /   Insurance ID: [id number] - (Medicare Managed) Secondary Insurance (if applicable):    Referring Physician: Sandra Tyler MD     PCP: Sandra Tyler MD Visits to Date/Visits Approved:   ERT    No Show/Cancelled Appts:   /       Medical Diagnosis: Lumbar radiculopathy, chronic [M54.16]  Osteoarthritis of spine with radiculopathy, lumbar region [M47.26] Lumbar radiculopathy, chronic  Treatment Diagnosis: back/leg pain, mobility deficits       ASSESSMENT     Impression: Assessment: Pt is a 66 y.o. male w/dx OA spine, lumbar radiculopathy. Reports having had R thigh numbness approx 2 years and now is worse. Numb and burns. Symptoms have worsened and it feels like his back is going to give out w/legs giving out. Three weeks ago, attempted to stand from the bed and legs gave out/fell back onto the bed and needed help to get to a chair. Was taken to ER. Reports his back hurt very bad, paramedics had to take him to the ER. Since then, his back and right hip/leg are painful. WORSE:  walking > 20'. The right leg and L foot will tingle. BETTER:  Sitting, medication. No loss B/B, (-) valsalva. Today, patient presents w/symptoms consistent w/lumbar radiculopathy, core and LE weakness, pain and will benefit from physical therapy.     Body Structures, Functions, Activity Limitations Requiring Skilled Therapeutic Intervention: Decreased functional mobility ,

## 2023-10-25 ENCOUNTER — HOSPITAL ENCOUNTER (OUTPATIENT)
Dept: PHYSICAL THERAPY | Age: 63
Setting detail: THERAPIES SERIES
Discharge: HOME | End: 2023-10-25
Payer: MEDICARE

## 2023-10-25 PROCEDURE — 97110 THERAPEUTIC EXERCISES: CPT

## 2023-10-25 NOTE — FLOWSHEET NOTE
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  Diagnosis:   Lumbar radiculopathy, chronic [M54.16]  Osteoarthritis of spine with radiculopathy, lumbar region [M47.26] Diagnosis: Lumbar radiculopathy, chronic  Date of Injury/Surgery: chronic  Treatment Diagnosis:  back/leg pain, mobility deficits  Insurance/Certification information: [facility] Medicare - Precert  Referring Physician:  Matt Mosher     PCP: Margarito Rubi MD  Next Doctor Visit:  unknown  Plan of care signed (Y/N):  pending  Outcome Measure:   Eval:  OSW 34/45 76% disability  Visit# / total visits:   2/8 approved by insurance (10/11/23 - 23)  Pain level: \" Moderate\" 5/10 Low back R>L, R hip, R anterolateral thigh  Goals:     Patient goals: function w/less pain, easier to walk     Long Term Goals  Time Frame for Long Term Goals: In 4 weeks, patient will  demonstrate compliance and SBA w/HEP (wife assist.)  score at <= 50% disability on OSW as indication of improved function w/daily activity. report no >= 4/10 back and leg symptoms/pain during waking hours. Summary of Evaluation:  Assessment: Pt is a 66 y.o. male w/dx OA spine, lumbar radiculopathy. Reports having had R thigh numbness approx 2 years and now is worse. Numb and burns. Symptoms have worsened and it feels like his back is going to give out w/legs giving out. Three weeks ago, attempted to stand from the bed and legs gave out/fell back onto the bed and needed help to get to a chair. Was taken to ER.   Reports his back hurt very bad, paramedics had

## 2023-10-30 ENCOUNTER — TELEPHONE (OUTPATIENT)
Dept: FAMILY MEDICINE CLINIC | Age: 63
End: 2023-10-30

## 2023-10-30 NOTE — TELEPHONE ENCOUNTER
Pts wife notified and gave verbal understanding. Pt has not received a call from Pain Management to schedule an appt.

## 2023-10-30 NOTE — TELEPHONE ENCOUNTER
Yes, he can increase the gabapentin to up to 3 capsules three time per day to balance pain control and any possible drowsy side effect from medicine

## 2023-10-30 NOTE — TELEPHONE ENCOUNTER
Patent notified and gave verbal understanding. Patients wife stated pt is swollen on right side of mid to lower back. He seemed to be in pain when PT did the ball under his back. Patient feels the swelling is from that.  Please advise

## 2023-10-30 NOTE — TELEPHONE ENCOUNTER
Patients wife called in and stated that the patient goes to physical therapy and now he is c/o severe back pain that is radiating from the back all the way around to the stomach on the right side. They would like to know if they can increase pain medication right now he takes 1 in the am and 2 at night. Wife states that the 1 in the am is not working. Please advice. Wife states this pain didn't start till after physical therapy.

## 2023-10-30 NOTE — TELEPHONE ENCOUNTER
Ice pack can help 20 minutes a few times per day. Does patient have pain management referral appointment scheduled?

## 2023-11-07 ENCOUNTER — TELEPHONE (OUTPATIENT)
Dept: PHARMACY | Facility: CLINIC | Age: 63
End: 2023-11-07

## 2023-11-07 ENCOUNTER — OFFICE VISIT (OUTPATIENT)
Dept: FAMILY MEDICINE CLINIC | Age: 63
End: 2023-11-07
Payer: MEDICARE

## 2023-11-07 VITALS
BODY MASS INDEX: 41.75 KG/M2 | HEART RATE: 84 BPM | DIASTOLIC BLOOD PRESSURE: 74 MMHG | HEIGHT: 73 IN | OXYGEN SATURATION: 97 % | SYSTOLIC BLOOD PRESSURE: 136 MMHG | WEIGHT: 315 LBS

## 2023-11-07 DIAGNOSIS — R41.3 MEMORY CHANGES: ICD-10-CM

## 2023-11-07 DIAGNOSIS — R73.03 PREDIABETES: ICD-10-CM

## 2023-11-07 DIAGNOSIS — E66.01 MORBID OBESITY WITH BMI OF 40.0-44.9, ADULT (HCC): ICD-10-CM

## 2023-11-07 DIAGNOSIS — M54.16 LUMBAR RADICULOPATHY, CHRONIC: Primary | ICD-10-CM

## 2023-11-07 PROCEDURE — 3078F DIAST BP <80 MM HG: CPT | Performed by: FAMILY MEDICINE

## 2023-11-07 PROCEDURE — 99214 OFFICE O/P EST MOD 30 MIN: CPT | Performed by: FAMILY MEDICINE

## 2023-11-07 PROCEDURE — 3075F SYST BP GE 130 - 139MM HG: CPT | Performed by: FAMILY MEDICINE

## 2023-11-07 NOTE — TELEPHONE ENCOUNTER
Anil Orozco MD: per below, appt with you today 23 at 8:30am  - chart reviewed due to insurer-identified gap: diabetes Rx claim (metformin) but no statin Rx  - per chart pt has h/o prediabetes and can be excluded from metric if certain MQ/NJN-12 code done at provider visit    If appropriate, please consider adding CMS allowable diagnosis within your 23 visit encounter:  \"Prediabetes\" (ICD-10 R73.03)  \"Other abnormal glucose\" (ICD-10 R73.09) also accepted by CMS if preferred  this will complete/close this insurer-identified gap for this calendar year    Thank you,  Jim Hoffman, PharmD, [address] [place] [address], toll free: [phone], option 1  ==============================================================  [address] [facility] Ne: ADHERENCE REVIEW  Identified care gap per [place]: fills at [facility]: ACE/ARB and Diabetes adherence    ASSESSMENT    ACE/ARB ADHERENCE    Insurance Records claims through 10/31/23 (Prior Year [address] [place] [address] Street = not reported; [facility] [address] [place] [address] Street = 100% - PASSED):   Lisinopril 5 mg tab last filled on 10/5/23 for 90 day supply. Next refill due: 1/3/24    DIABETES ADHERENCE    Insurance Records claims through 10/31/23 (Prior Year [address] [place] [address] = not reported; [facility] [address] [place] [address] Street = FIRST FILL; Potential Fail Date: 23): Metformin 500 mg tab last filled on 23 for 30 day supply. Next refill due: 10/26/23    Prescribed si tablet/capsule twice daily    Per Reconcile Dispense History:   METFORMIN  MG TABLET 2023 30 60 each Therese Shrestha, Katharine Rinne, MD [address] [place] [id number] - NEW . ..   5RR per hyperlink    Lab Results   Component Value Date    LABA1C 5.4 2018    LABA1C 5.4 2012     NOTE: A1c not yet completed this calendar year    SUPD - will be in SUPD metric if metformin filled again  Metformin started 23 OV - prediabetes/metabolic syndrome  On prednisone, muscle relaxer and meloxicam from ER.  Patient

## 2023-11-08 ENCOUNTER — HOSPITAL ENCOUNTER (OUTPATIENT)
Dept: PHYSICAL THERAPY | Age: 63
Setting detail: THERAPIES SERIES
Discharge: HOME | End: 2023-11-08
Payer: MEDICARE

## 2023-11-08 PROCEDURE — 97110 THERAPEUTIC EXERCISES: CPT

## 2023-11-08 PROCEDURE — 97140 MANUAL THERAPY 1/> REGIONS: CPT

## 2023-11-08 NOTE — FLOWSHEET NOTE
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  Diagnosis:   Lumbar radiculopathy, chronic [M54.16]  Osteoarthritis of spine with radiculopathy, lumbar region [M47.26] Diagnosis: Lumbar radiculopathy, chronic  Date of Injury/Surgery: chronic  Treatment Diagnosis:  back/leg pain, mobility deficits  Insurance/Certification information: [facility] Medicare - Precert  Referring Physician:  Thuan Keenna     PCP: Alberto Panchal MD  Next Doctor Visit:  unknown  Plan of care signed (Y/N):  Yes  Outcome Measure:   Eval:  OSW 34/45 76% disability  Visit# / total visits:   3/8 approved by insurance (10/11/23 - 23)  Pain level: \" Moderate\" 5/10 Low back R>L, R hip, R anterolateral thigh stiffness; 5/10 L thoracic paraspinal muscle spasm  Goals:     Patient goals: function w/less pain, easier to walk     Long Term Goals  Time Frame for Long Term Goals: In 4 weeks, patient will  demonstrate compliance and SBA w/HEP (wife assist.)  score at <= 50% disability on OSW as indication of improved function w/daily activity. report no >= 4/10 back and leg symptoms/pain during waking hours. Summary of Evaluation:  Assessment: Pt is a 66 y.o. male w/dx OA spine, lumbar radiculopathy. Reports having had R thigh numbness approx 2 years and now is worse. Numb and burns. Symptoms have worsened and it feels like his back is going to give out w/legs giving out. Three weeks ago, attempted to stand from the bed and legs gave out/fell back onto the bed and needed help to get to a chair. Was taken to ER.

## 2023-11-13 ENCOUNTER — TELEPHONE (OUTPATIENT)
Dept: FAMILY MEDICINE CLINIC | Age: 63
End: 2023-11-13

## 2023-11-13 NOTE — TELEPHONE ENCOUNTER
Noted \"prediabetes\" added to 11/7/23 OV, thank you! Should be excluded from SUPD metric. Metformin was marked as taking at 11/7/23 OV, continues on med list. [facility]GLYNN JACKIE was ordered. No claims noted for either medication in [employer] portal.    Attempt made to reach patient to clarify if still taking metformin. No answer unable to LM (mailbox full). [employer] message sent.

## 2023-11-14 NOTE — TELEPHONE ENCOUNTER
Attempted to leave a vm not able to at this time.
Patient spouse ( Lina Orantes) called wanted to know what provider would suggest for pt to do at this time. Stated that middle of patient back on the left side there is a large bump, stated that it is puffy and painful. Stated that a massage has not helped the patient as well. Would like to know what they should do at this time. Please advise.
Spoke to spouse and she stated that she had told the PT team and they stated that it may be  pulled muscle. Stated that PT attempted to massage and caused patient pain. Stated that patient told her that his pain is moving into the rib cage, and that it looks like the lump has gotten bigger over night. Advised that she should call PT team and let them know what is going on with patient as they may want to wait a week before resuming PT.
Team to please instruct patient to Ice down area for 15 minutes every hour while awake and let his PT team know in case they need to push back his upcoming 11/15 appt
Irregular Contractions

## 2023-11-15 ENCOUNTER — HOSPITAL ENCOUNTER (OUTPATIENT)
Dept: PHYSICAL THERAPY | Age: 63
Setting detail: THERAPIES SERIES
Discharge: HOME | End: 2023-11-15
Payer: MEDICARE

## 2023-11-15 PROCEDURE — 97140 MANUAL THERAPY 1/> REGIONS: CPT

## 2023-11-15 PROCEDURE — 97110 THERAPEUTIC EXERCISES: CPT

## 2023-11-15 NOTE — FLOWSHEET NOTE
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  Diagnosis:   Lumbar radiculopathy, chronic [M54.16]  Osteoarthritis of spine with radiculopathy, lumbar region [M47.26] Diagnosis: Lumbar radiculopathy, chronic  Date of Injury/Surgery: chronic  Treatment Diagnosis:  back/leg pain, mobility deficits  Insurance/Certification information: [facility] Medicare - Precert  Referring Physician:  Ting Drummond     PCP: Elvira Parekh MD  Next Doctor Visit:  unknown  Plan of care signed (Y/N):  Yes  Outcome Measure:   Eval:  OSW 34/45 76% disability  Visit# / total visits:    approved by insurance (10/11/23 - 23)  Pain level: 3-5/10 Low back,  L thoracic paraspinal   Goals:     Patient goals: function w/less pain, easier to walk     Long Term Goals  Time Frame for Long Term Goals: In 4 weeks, patient will  demonstrate compliance and SBA w/HEP (wife assist.)  score at <= 50% disability on OSW as indication of improved function w/daily activity. report no >= 4/10 back and leg symptoms/pain during waking hours. Summary of Evaluation:  Assessment: Pt is a 66 y.o. male w/dx OA spine, lumbar radiculopathy. Reports having had R thigh numbness approx 2 years and now is worse. Numb and burns. Symptoms have worsened and it feels like his back is going to give out w/legs giving out. Three weeks ago, attempted to stand from the bed and legs gave out/fell back onto the bed and needed help to get to a chair. Was taken to ER. Reports his back hurt very bad, paramedics had to take him to the ER.

## 2023-11-17 ENCOUNTER — HOSPITAL ENCOUNTER (OUTPATIENT)
Dept: PHYSICAL THERAPY | Age: 63
Setting detail: THERAPIES SERIES
Discharge: HOME | End: 2023-11-17
Payer: MEDICARE

## 2023-11-17 PROCEDURE — 97140 MANUAL THERAPY 1/> REGIONS: CPT

## 2023-11-17 PROCEDURE — 97110 THERAPEUTIC EXERCISES: CPT

## 2023-11-17 NOTE — FLOWSHEET NOTE
POC and treatment rationale/progression expected reviewed and verbally accepted by /patient and wife. Wife is willing to be the co-learner and assist pt w/HEP as he in blind and a fall risk. Home Exercise Program:  10/11/23:  TA, PPT, heel slides, hip add kingsley    Manual Treatments:  Manual to thoracolumbar region and bilat lumbar paraspinals including rib glides, TPR/MFR, IASTM, sick rolling, and LLLD TPR    Modalities:  None    Communication with other providers:      Assessment:  (Response towards treatment session) (Pain Rating) Pt presents with increased complaints of pain along left thoracolumbar spine region - palpable muscle knot present. Pt does not appear to respond well to listed manual interventions - with no change in reported pain levels. Pt would continue to benefit from skilled therapy interventions to address remaining impairments, improve mobility and strength and progress toward goal completion while reducing risk for re-injury or further decline.    End pain = 5/10 still    Plan for Next Session: Specific Instructions for Next Treatment: lumbar stabilization, hip/knee/postural/core strengthening, hip stretching/ROM, HEP    Time In / Time Out:    3879/0226    Timed Code/Total Treatment Minutes:   MAN X 34' X 2;   TE X 10' X 1    Next Progress Note due:  10 visits    Plan of Care Interventions:  [x] Therapeutic Exercise  [x] Modalities:  [x] Therapeutic Activity     [x] Ultrasound  [x] Estim  [x] Gait Training      [] Cervical Traction [] Lumbar Traction  [x] Neuromuscular Re-education    [x] Cold/hotpack [] Iontophoresis   [x] Instruction in HEP      [] Vasopneumatic   [] Dry Needling    [x] Manual Therapy               [] Aquatic Therapy            Electronically signed by:  Myron Angeles II, PTA [id number]       11/17/2023, 6:06 AM

## 2023-11-21 ENCOUNTER — HOSPITAL ENCOUNTER (OUTPATIENT)
Dept: PHYSICAL THERAPY | Age: 63
Setting detail: THERAPIES SERIES
Discharge: HOME | End: 2023-11-21
Payer: MEDICARE

## 2023-11-21 PROCEDURE — 97530 THERAPEUTIC ACTIVITIES: CPT

## 2023-11-21 PROCEDURE — 97110 THERAPEUTIC EXERCISES: CPT

## 2023-11-21 NOTE — FLOWSHEET NOTE
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  Diagnosis:   Lumbar radiculopathy, chronic [M54.16]  Osteoarthritis of spine with radiculopathy, lumbar region [M47.26] Diagnosis: Lumbar radiculopathy, chronic  Date of Injury/Surgery: chronic  Treatment Diagnosis:  back/leg pain, mobility deficits  Insurance/Certification information: [facility] Medicare - Precert  Referring Physician:  Erasmo Tompkins     PCP: Aisha Mata MD  Next Doctor Visit:  unknown  Plan of care signed (Y/N):  Yes  Outcome Measure:   Eval:  OSW 34/45 76% disability  Visit# / total visits:    approved by insurance (10/11/23 - 23)  Pain level: 5/10   Goals:     Patient goals: function w/less pain, easier to walk     Long Term Goals  Time Frame for Long Term Goals: In 4 weeks, patient will  demonstrate compliance and SBA w/HEP (wife assist.)  score at <= 50% disability on OSW as indication of improved function w/daily activity. report no >= 4/10 back and leg symptoms/pain during waking hours. Subjective: Pt stated       Summary of Evaluation:  Assessment: Pt is a 66 y.o. male w/dx OA spine, lumbar radiculopathy. Reports having had R thigh numbness approx 2 years and now is worse. Numb and burns. Symptoms have worsened and it feels like his back is going to give out w/legs giving out. Three weeks ago, attempted to stand from the bed and legs gave out/fell back onto the bed and needed help to get to a chair. Was taken to ER. Reports his back hurt very bad, paramedics had to take him to the ER.   Since then, his

## 2023-11-28 ENCOUNTER — HOSPITAL ENCOUNTER (OUTPATIENT)
Dept: PHYSICAL THERAPY | Age: 63
Setting detail: THERAPIES SERIES
Discharge: HOME | End: 2023-11-28
Payer: MEDICARE

## 2023-11-28 PROCEDURE — 97110 THERAPEUTIC EXERCISES: CPT

## 2023-11-28 PROCEDURE — 97530 THERAPEUTIC ACTIVITIES: CPT

## 2023-11-28 NOTE — FLOWSHEET NOTE
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  Diagnosis:   Lumbar radiculopathy, chronic [M54.16]  Osteoarthritis of spine with radiculopathy, lumbar region [M47.26] Diagnosis: Lumbar radiculopathy, chronic  Date of Injury/Surgery: chronic  Treatment Diagnosis:  back/leg pain, mobility deficits  Insurance/Certification information: [facility] Medicare - Precert  Referring Physician:  Rosa Moreno     PCP: Dianna Loomis MD  Next Doctor Visit:  unknown  Plan of care signed (Y/N):  Yes  Outcome Measure:   Eval:  OSW 34/45 76% disability  Visit# / total visits:   /8 approved by insurance (10/11/23 - 23)  Pain level: 0/10   Goals:     Patient goals: function w/less pain, easier to walk     Long Term Goals  Time Frame for Long Term Goals: In 4 weeks, patient will  demonstrate compliance and SBA w/HEP (wife assist.)  score at <= 50% disability on OSW as indication of improved function w/daily activity. report no >= 4/10 back and leg symptoms/pain during waking hours. Summary of Evaluation:  Assessment: Pt is a 66 y.o. male w/dx OA spine, lumbar radiculopathy. Reports having had R thigh numbness approx 2 years and now is worse. Numb and burns. Symptoms have worsened and it feels like his back is going to give out w/legs giving out. Three weeks ago, attempted to stand from the bed and legs gave out/fell back onto the bed and needed help to get to a chair. Was taken to ER. Reports his back hurt very bad, paramedics had to take him to the ER.   Since then, his back and right

## 2024-02-28 DIAGNOSIS — M54.16 LUMBAR RADICULOPATHY, CHRONIC: ICD-10-CM

## 2024-02-28 RX ORDER — GABAPENTIN 100 MG/1
100 CAPSULE ORAL 3 TIMES DAILY PRN
Qty: 180 CAPSULE | Refills: 1 | Status: SHIPPED | OUTPATIENT
Start: 2024-02-28 | End: 2024-02-29 | Stop reason: SDUPTHER

## 2024-02-28 NOTE — TELEPHONE ENCOUNTER
Ketones, Urine   Date Value Ref Range Status   11/07/2010 NEGATIVE NEG MG/DL Final     Specific Gravity, UA   Date Value Ref Range Status   11/07/2010 1.025 1.001 - 1.035 Final     Blood, Urine   Date Value Ref Range Status   11/07/2010 NEGATIVE NEG Final     pH, Urine   Date Value Ref Range Status   11/07/2010 5.5 5.0 - 8.0 Final     Protein, UA   Date Value Ref Range Status   11/07/2010 NEGATIVE NEG MG/DL Final     Urobilinogen, Urine   Date Value Ref Range Status   11/07/2010 0.2 0.2 - 1.0 EU/DL Final     Nitrite Urine, Quantitative   Date Value Ref Range Status   11/07/2010 NEGATIVE NEG Final     Leukocyte Esterase, Urine   Date Value Ref Range Status   11/07/2010 NEGATIVE NEG Final     RBC, UA   Date Value Ref Range Status   11/07/2010 <1 0 - 3 /HPF Final     WBC, UA   Date Value Ref Range Status   11/07/2010 <1 0 - 2 /HPF Final     Bacteria, UA   Date Value Ref Range Status   11/07/2010 NEGATIVE NEG /HPF Final       Patient Care Team:  Jaclyn Grande MD as PCP - General (Family Medicine)  Jaclyn Grande MD as PCP - Empaneled Provider     Requested Pharmacy____________________________________________________________________

## 2024-02-29 ENCOUNTER — TELEPHONE (OUTPATIENT)
Dept: FAMILY MEDICINE CLINIC | Age: 64
End: 2024-02-29

## 2024-02-29 DIAGNOSIS — M54.16 LUMBAR RADICULOPATHY, CHRONIC: ICD-10-CM

## 2024-02-29 RX ORDER — GABAPENTIN 100 MG/1
CAPSULE ORAL
Qty: 540 CAPSULE | Refills: 1 | Status: SHIPPED | OUTPATIENT
Start: 2024-02-29 | End: 2025-02-28

## 2024-02-29 NOTE — TELEPHONE ENCOUNTER
When is his appointment with pain  management so they can help address the ongoing back issues. It was ordered at the end of last fall and my 11/09/23 OV note mentions     Pain management referral ordered 10/04/2023  [facility]         [address] [facility] [address]         [place], [place] [address] [phone]     Team to assist with appointment if needed from wife

## 2024-02-29 NOTE — TELEPHONE ENCOUNTER
Patient can increase 1-2 capsules every 8 hrs as needed for pain. More capsules sent to pharmacy [facility]

## 2024-03-12 ENCOUNTER — OFFICE VISIT (OUTPATIENT)
Dept: FAMILY MEDICINE CLINIC | Age: 64
End: 2024-03-12
Payer: MEDICARE

## 2024-03-12 VITALS
HEIGHT: 73 IN | SYSTOLIC BLOOD PRESSURE: 136 MMHG | BODY MASS INDEX: 41.75 KG/M2 | HEART RATE: 85 BPM | OXYGEN SATURATION: 94 % | WEIGHT: 315 LBS | DIASTOLIC BLOOD PRESSURE: 96 MMHG

## 2024-03-12 DIAGNOSIS — M54.16 LUMBAR RADICULOPATHY, CHRONIC: Primary | ICD-10-CM

## 2024-03-12 DIAGNOSIS — I10 ESSENTIAL HYPERTENSION: ICD-10-CM

## 2024-03-12 DIAGNOSIS — F48.9 BIZARRE THOUGHTS: ICD-10-CM

## 2024-03-12 PROCEDURE — 3080F DIAST BP >= 90 MM HG: CPT | Performed by: FAMILY MEDICINE

## 2024-03-12 PROCEDURE — 99214 OFFICE O/P EST MOD 30 MIN: CPT | Performed by: FAMILY MEDICINE

## 2024-03-12 PROCEDURE — 3075F SYST BP GE 130 - 139MM HG: CPT | Performed by: FAMILY MEDICINE

## 2024-03-12 RX ORDER — LISINOPRIL 5 MG/1
5 TABLET ORAL 2 TIMES DAILY
Qty: 180 TABLET | Refills: 1 | Status: SHIPPED | OUTPATIENT
Start: 2024-03-12

## 2024-03-12 RX ORDER — TIZANIDINE 2 MG/1
2 TABLET ORAL 3 TIMES DAILY PRN
Qty: 90 TABLET | Refills: 1 | Status: SHIPPED | OUTPATIENT
Start: 2024-03-12

## 2024-03-12 RX ORDER — GABAPENTIN 100 MG/1
CAPSULE ORAL
Qty: 540 CAPSULE | Refills: 1 | Status: SHIPPED | OUTPATIENT
Start: 2024-03-12 | End: 2025-03-12

## 2024-03-12 ASSESSMENT — PATIENT HEALTH QUESTIONNAIRE - PHQ9
SUM OF ALL RESPONSES TO PHQ QUESTIONS 1-9: 0
SUM OF ALL RESPONSES TO PHQ9 QUESTIONS 1 & 2: 0
2. FEELING DOWN, DEPRESSED OR HOPELESS: 0
1. LITTLE INTEREST OR PLEASURE IN DOING THINGS: 0
SUM OF ALL RESPONSES TO PHQ QUESTIONS 1-9: 0

## 2024-03-12 NOTE — PROGRESS NOTES
(Site: Left Upper Arm, Position: Sitting)   Pulse 85   Ht 1.854 m (6' 1\")   Wt (!) 152.7 kg (336 lb 9.6 oz)   SpO2 94%   BMI 44.41 kg/m²   BP Readings from Last 3 Encounters:   03/12/24 (!) 136/96   11/07/23 136/74   09/26/23 (!) 142/92     Wt Readings from Last 3 Encounters:   03/12/24 (!) 152.7 kg (336 lb 9.6 oz)   11/07/23 (!) 150 kg (330 lb 12.8 oz)   09/26/23 (!) 149.2 kg (329 lb)     No results found for this visit on 03/12/24.    Blood pressure-  Heart rate-    Respiratory rate-    Temperature-  Pulse oximetry-         1/20/2022     8:56 AM   Patient-Reported Vitals   Patient-Reported Weight 324   Patient-Reported Systolic 140 mmHg   Patient-Reported Diastolic 82 mmHg   Patient-Reported Pulse 100        Nursing note reviewed  BP (!) 136/96 (Site: Left Upper Arm, Position: Sitting)   Pulse 85   Ht 1.854 m (6' 1\")   Wt (!) 152.7 kg (336 lb 9.6 oz)   SpO2 94%   BMI 44.41 kg/m²   BP Readings from Last 3 Encounters:   03/12/24 (!) 136/96   11/07/23 136/74   09/26/23 (!) 142/92     Wt Readings from Last 3 Encounters:   03/12/24 (!) 152.7 kg (336 lb 9.6 oz)   11/07/23 (!) 150 kg (330 lb 12.8 oz)   09/26/23 (!) 149.2 kg (329 lb)       General appearance: cooperative here with wife.   Neck: supple, symmetrical, trachea midline  Lungs: clear to auscultation bilaterally  Heart: regular rate and rhythm, S1, S2 normal,  Abdomen:  bowel sounds normal and soft, non-tender  MSK - patient sitting on exam table and requires wifes' hand to guide him off step. Walks with slow non antalgic gait today without cane or walker.   Skin: Skin color, texture, turgor normal. No rashes or lesions  Neurologic: Grossly normal   Psych: Alert and oriented, appropriate affect.      Assessment and Plan: See above

## 2024-03-14 ENCOUNTER — TELEPHONE (OUTPATIENT)
Dept: FAMILY MEDICINE CLINIC | Age: 64
End: 2024-03-14

## 2024-03-14 NOTE — TELEPHONE ENCOUNTER
Pts wife called regarding referral to pain management. She stated she has never heard from them. She asked if there is somewhere closer they could have a referral to. Please advise

## 2024-03-15 NOTE — TELEPHONE ENCOUNTER
Wife reports she did receive a call from Pain Management yesterday and she will be trying to call them back today

## 2024-04-02 ENCOUNTER — TELEPHONE (OUTPATIENT)
Dept: FAMILY MEDICINE CLINIC | Age: 64
End: 2024-04-02

## 2024-04-02 NOTE — TELEPHONE ENCOUNTER
Pts wife called. Pts disability placard is expiring this month. Pt is requesting a new one. Pts wife would like this mailed to patient. Please advise

## 2024-04-09 ENCOUNTER — TELEPHONE (OUTPATIENT)
Dept: FAMILY MEDICINE CLINIC | Age: 64
End: 2024-04-09

## 2024-04-09 NOTE — TELEPHONE ENCOUNTER
Patient spouse called and requested a order for a Lift Chair.  Stated that patient is having a difficult time with getting up from a standard chair.   Also requested a order for a cane.

## 2024-04-16 ENCOUNTER — OFFICE VISIT (OUTPATIENT)
Dept: FAMILY MEDICINE CLINIC | Age: 64
End: 2024-04-16
Payer: MEDICARE

## 2024-04-16 VITALS
OXYGEN SATURATION: 95 % | WEIGHT: 315 LBS | BODY MASS INDEX: 41.75 KG/M2 | SYSTOLIC BLOOD PRESSURE: 136 MMHG | HEIGHT: 73 IN | HEART RATE: 90 BPM | DIASTOLIC BLOOD PRESSURE: 84 MMHG

## 2024-04-16 DIAGNOSIS — Z00.00 MEDICARE ANNUAL WELLNESS VISIT, SUBSEQUENT: Primary | ICD-10-CM

## 2024-04-16 DIAGNOSIS — E66.01 OBESITY, CLASS III, BMI 40-49.9 (MORBID OBESITY) (HCC): ICD-10-CM

## 2024-04-16 DIAGNOSIS — M54.16 LUMBAR RADICULOPATHY, CHRONIC: ICD-10-CM

## 2024-04-16 DIAGNOSIS — R05.1 ACUTE COUGH: ICD-10-CM

## 2024-04-16 DIAGNOSIS — Z91.81 AT RISK FOR FALLS: ICD-10-CM

## 2024-04-16 PROCEDURE — 3075F SYST BP GE 130 - 139MM HG: CPT | Performed by: FAMILY MEDICINE

## 2024-04-16 PROCEDURE — 99213 OFFICE O/P EST LOW 20 MIN: CPT | Performed by: FAMILY MEDICINE

## 2024-04-16 PROCEDURE — G0439 PPPS, SUBSEQ VISIT: HCPCS | Performed by: FAMILY MEDICINE

## 2024-04-16 PROCEDURE — 3079F DIAST BP 80-89 MM HG: CPT | Performed by: FAMILY MEDICINE

## 2024-04-16 RX ORDER — ALBUTEROL SULFATE 90 UG/1
2 AEROSOL, METERED RESPIRATORY (INHALATION) EVERY 4 HOURS PRN
Qty: 18 G | Refills: 6 | Status: SHIPPED | OUTPATIENT
Start: 2024-04-16

## 2024-04-16 RX ORDER — BENZONATATE 100 MG/1
CAPSULE ORAL
Qty: 30 CAPSULE | Refills: 0 | Status: SHIPPED | OUTPATIENT
Start: 2024-04-16

## 2024-04-16 SDOH — ECONOMIC STABILITY: FOOD INSECURITY: WITHIN THE PAST 12 MONTHS, YOU WORRIED THAT YOUR FOOD WOULD RUN OUT BEFORE YOU GOT MONEY TO BUY MORE.: NEVER TRUE

## 2024-04-16 SDOH — ECONOMIC STABILITY: HOUSING INSECURITY
IN THE LAST 12 MONTHS, WAS THERE A TIME WHEN YOU DID NOT HAVE A STEADY PLACE TO SLEEP OR SLEPT IN A SHELTER (INCLUDING NOW)?: NO

## 2024-04-16 SDOH — ECONOMIC STABILITY: INCOME INSECURITY: HOW HARD IS IT FOR YOU TO PAY FOR THE VERY BASICS LIKE FOOD, HOUSING, MEDICAL CARE, AND HEATING?: NOT HARD AT ALL

## 2024-04-16 SDOH — ECONOMIC STABILITY: FOOD INSECURITY: WITHIN THE PAST 12 MONTHS, THE FOOD YOU BOUGHT JUST DIDN'T LAST AND YOU DIDN'T HAVE MONEY TO GET MORE.: NEVER TRUE

## 2024-04-16 ASSESSMENT — PATIENT HEALTH QUESTIONNAIRE - PHQ9
SUM OF ALL RESPONSES TO PHQ QUESTIONS 1-9: 0
SUM OF ALL RESPONSES TO PHQ QUESTIONS 1-9: 0
1. LITTLE INTEREST OR PLEASURE IN DOING THINGS: NOT AT ALL
SUM OF ALL RESPONSES TO PHQ QUESTIONS 1-9: 0
SUM OF ALL RESPONSES TO PHQ9 QUESTIONS 1 & 2: 0
2. FEELING DOWN, DEPRESSED OR HOPELESS: NOT AT ALL
SUM OF ALL RESPONSES TO PHQ QUESTIONS 1-9: 0

## 2024-04-16 ASSESSMENT — LIFESTYLE VARIABLES
HOW OFTEN DO YOU HAVE A DRINK CONTAINING ALCOHOL: NEVER
HOW MANY STANDARD DRINKS CONTAINING ALCOHOL DO YOU HAVE ON A TYPICAL DAY: PATIENT DOES NOT DRINK

## 2024-04-16 NOTE — PROGRESS NOTES
Medicare Annual Wellness Visit    Jayson Quintanilla is here for Medicare AWV and URI (Wheezing, cough, congestion )    Assessment & Plan   Medicare annual wellness visit, subsequent  Recommendations for Preventive Services Due: see orders and patient instructions/AVS.  Recommended screening schedule for the next 5-10 years is provided to the patient in written form: see Patient Instructions/AVS.     No follow-ups on file.     Subjective   The following acute and/or chronic problems were also addressed today:  Chief Complaint   Patient presents with    Medicare AWV    URI     Wheezing, cough, congestion          Patient's complete Health Risk Assessment and screening values have been reviewed and are found in Flowsheets. The following problems were reviewed today and where indicated follow up appointments were made and/or referrals ordered.    Positive Risk Factor Screenings with Interventions:                Activity, Diet, and Weight:               Body mass index is 43.91 kg/m². (!) Abnormal      Inactivity Interventions:  Patient will see pain specialist soon  Obesity Interventions:  Wife is working on low cab diet                                Objective   Vitals:    04/16/24 0923   BP: 136/84   Site: Left Upper Arm   Position: Sitting   Pulse: 90   SpO2: 95%   Weight: (!) 151 kg (332 lb 12.8 oz)   Height: 1.854 m (6' 1\")      Body mass index is 43.91 kg/m².               No Known Allergies  Prior to Visit Medications    Medication Sig Taking? Authorizing Provider   gabapentin (NEURONTIN) 100 MG capsule 3 cap morning, 3 capsules afternoon if needed, and 4 capsules at bedtime Yes Jaclyn Grande MD   tiZANidine (ZANAFLEX) 2 MG tablet Take 1 tablet by mouth 3 times daily as needed (muscle spasms) Yes Jaclyn Grande MD   lisinopril (PRINIVIL;ZESTRIL) 5 MG tablet Take 1 tablet by mouth in the morning and at bedtime Dose increase as of 3/12/2024 Yes Jaclyn Grande MD

## 2024-04-16 NOTE — PATIENT INSTRUCTIONS
Try to avoid secondhand smoke too.     Stay at a weight that's healthy for you. Talk to your doctor if you need help losing weight.     Try to get 7 to 9 hours of sleep each night.     Limit alcohol to 2 drinks a day for men and 1 drink a day for women. Too much alcohol can cause health problems.     Manage other health problems such as diabetes, high blood pressure, and high cholesterol. If you think you may have a problem with alcohol or drug use, talk to your doctor.   Medicines    Take your medicines exactly as prescribed. Call your doctor if you think you are having a problem with your medicine.     If your doctor recommends aspirin, take the amount directed each day. Make sure you take aspirin and not another kind of pain reliever, such as acetaminophen (Tylenol).   When should you call for help?   Call 911 if you have symptoms of a heart attack. These may include:    Chest pain or pressure, or a strange feeling in the chest.     Sweating.     Shortness of breath.     Pain, pressure, or a strange feeling in the back, neck, jaw, or upper belly or in one or both shoulders or arms.     Lightheadedness or sudden weakness.     A fast or irregular heartbeat.   After you call 911, the  may tell you to chew 1 adult-strength or 2 to 4 low-dose aspirin. Wait for an ambulance. Do not try to drive yourself.  Watch closely for changes in your health, and be sure to contact your doctor if you have any problems.  Where can you learn more?  Go to https://www.[employer].net/patientEd and enter F075 to learn more about \"A Healthy Heart: Care Instructions.\"  Current as of: June 24, 2023               Content Version: 14.0  © 2006-2024 [employer].   Care instructions adapted under license by [employer]. If you have questions about a medical condition or this instruction, always ask your healthcare professional. [employer] disclaims any warranty or liability for your use of this

## 2024-04-16 NOTE — TELEPHONE ENCOUNTER
Discussed with patient and wife today. She plans to check on coverage and online. And would consider PT/OT eval to make sure the correct equipment is used with patient

## 2024-04-16 NOTE — PROGRESS NOTES
Plan:     1. Medicare annual wellness visit, subsequent  2. Acute cough  -     XR CHEST (2 VW); Future  -     albuterol sulfate HFA (PROVENTIL HFA) 108 (90 Base) MCG/ACT inhaler; Inhale 2 puffs into the lungs every 4 hours as needed for Wheezing Can substitute albuterol inhaler per formulary, Disp-18 g, R-6Normal  -     benzonatate (TESSALON PERLES) 100 MG capsule; 1-2 every 8 hours as needed for cough, Disp-30 capsule, R-0Normal  3. Obesity, Class III, BMI 40-49.9 (morbid obesity) (HCC)- patient has been stress eating and less active due to back issues and wife working on low carb diet.   4. Lumbar radiculopathy, chronic  5. At risk for falls    Cough, cxr ordered, they will do a COIVD test at home today.   Albuterol as needed    F/u with pain specialist on next week 4/24.   Consider PT /OT eval for home DME needs as patient now having trouble getting out of recliner and is a fall risk.       Discussed use, benefit, and side effects of prescribed medications.   Patient instructed to notify if develop side effects from medications. Barriers to medication compliance addressed.   All patient questions answered.  Pt voiced understanding.     Return for 9/3 on 10:30 am f/u chronic pain .  -----------------------------------------------------------------------------------------------            Chief Complaint   Patient presents with    Medicare AWV    URI     Wheezing, cough, congestion      URI symptoms for the last 7 days.  Still has lumbar radiculopathy issues and feels unsteady even getting out of bed that wife has to assist.  Has upcoming appt with pain specialist in the next week.  The patient denies abdominal or flank pain, anorexia, nausea or vomiting, dysphagia, change in bowel habits or black or bloody stools or weight loss.      ROS: Pertinent items are noted in HPI. All other ROS negative     reports that he has never smoked. He has never used smokeless tobacco.      Physical Exam   Nursing note reviewed  BP

## 2024-04-24 ENCOUNTER — TELEPHONE (OUTPATIENT)
Dept: FAMILY MEDICINE CLINIC | Age: 64
End: 2024-04-24

## 2024-04-24 DIAGNOSIS — Z91.81 AT RISK FOR FALLS: ICD-10-CM

## 2024-04-24 DIAGNOSIS — M47.26 OSTEOARTHRITIS OF SPINE WITH RADICULOPATHY, LUMBAR REGION: ICD-10-CM

## 2024-04-24 DIAGNOSIS — M54.16 LUMBAR RADICULOPATHY, CHRONIC: Primary | ICD-10-CM

## 2024-04-24 NOTE — TELEPHONE ENCOUNTER
Wife will need to let us know which new pain provider are in his insurance plan so we can fax new referral

## 2024-04-25 NOTE — TELEPHONE ENCOUNTER
Ext Referral for Pain clinic ordered for team to please fax and assist with getting patient appointment

## 2024-04-25 NOTE — TELEPHONE ENCOUNTER
Pt and wife notified. Wife stated [facility] in [place] is covered. Their p# is [phone]. They are only open on Tuesdays from 8-5 but wife said Tuesdays are good for them. Please advise

## 2024-05-13 ENCOUNTER — TELEPHONE (OUTPATIENT)
Dept: FAMILY MEDICINE CLINIC | Age: 64
End: 2024-05-13

## 2024-05-13 NOTE — TELEPHONE ENCOUNTER
Received a call from patient spouse (Maria Guadalupe, HIPAA)  stated that patient heart rate has been high past couple of days () , sweating profusely, BP was 196/94, pacing for two day, along with SOB.  Spoke to Dr. Christiane Hawk and she advised  that patient should be seen at ED.  Advised the spouse and she stated that she will take the patient to ED for evaluation.

## 2024-05-15 ENCOUNTER — OFFICE VISIT (OUTPATIENT)
Dept: FAMILY MEDICINE CLINIC | Age: 64
End: 2024-05-15
Payer: MEDICARE

## 2024-05-15 ENCOUNTER — TELEPHONE (OUTPATIENT)
Dept: CARDIOLOGY CLINIC | Age: 64
End: 2024-05-15

## 2024-05-15 VITALS
SYSTOLIC BLOOD PRESSURE: 134 MMHG | HEART RATE: 78 BPM | BODY MASS INDEX: 41.75 KG/M2 | HEIGHT: 73 IN | WEIGHT: 315 LBS | DIASTOLIC BLOOD PRESSURE: 96 MMHG | OXYGEN SATURATION: 95 %

## 2024-05-15 DIAGNOSIS — G47.33 OSA (OBSTRUCTIVE SLEEP APNEA): ICD-10-CM

## 2024-05-15 DIAGNOSIS — M47.26 OSTEOARTHRITIS OF SPINE WITH RADICULOPATHY, LUMBAR REGION: ICD-10-CM

## 2024-05-15 DIAGNOSIS — M54.12 CERVICAL RADICULOPATHY: ICD-10-CM

## 2024-05-15 DIAGNOSIS — M54.16 LUMBAR RADICULOPATHY: ICD-10-CM

## 2024-05-15 DIAGNOSIS — F41.9 ANXIETY: Primary | ICD-10-CM

## 2024-05-15 DIAGNOSIS — I10 ESSENTIAL HYPERTENSION: ICD-10-CM

## 2024-05-15 DIAGNOSIS — M54.16 LUMBAR RADICULOPATHY, CHRONIC: ICD-10-CM

## 2024-05-15 PROBLEM — E11.9 TYPE 2 DIABETES MELLITUS (HCC): Status: ACTIVE | Noted: 2024-05-15

## 2024-05-15 PROCEDURE — 3080F DIAST BP >= 90 MM HG: CPT | Performed by: NURSE PRACTITIONER

## 2024-05-15 PROCEDURE — 3075F SYST BP GE 130 - 139MM HG: CPT | Performed by: NURSE PRACTITIONER

## 2024-05-15 PROCEDURE — 99215 OFFICE O/P EST HI 40 MIN: CPT | Performed by: NURSE PRACTITIONER

## 2024-05-15 RX ORDER — LISINOPRIL 5 MG/1
TABLET ORAL
Qty: 90 TABLET | Refills: 0 | Status: SHIPPED | OUTPATIENT
Start: 2024-05-15

## 2024-05-15 RX ORDER — ESCITALOPRAM OXALATE 5 MG/1
5 TABLET ORAL DAILY
Qty: 90 TABLET | Refills: 0 | Status: SHIPPED | OUTPATIENT
Start: 2024-05-15

## 2024-05-15 NOTE — PROGRESS NOTES
5/15/2024     Jayson Quintanilla (:  1960) is a 64 y.o. male, here for evaluation of the following medical concerns:        Presents with complaint of \"painful lump on chest\"   Pt unknown to this provider. PCP dr marshall   Multiple other concerns  Wife helping with HPI  He is blind and deaf.  Wears hearing aids        HTN uncontrolled.   Lisinopril 5mg BID. Increased 2024 by PCP   Seen at ER Monday this week for this. 169/99  Per ER notes. Was anxious in ER.   Reports nightly episodes with anxiety, high heart rate, sweating and shortness of breath.  Denies suicidal self harm thought plan idea  Unremarkable BMP CBC troponin EKG CXRAY  States blood pressure has been running 150s to 160s systolic at home as well        Anxiety   Prev On lexapro 5mg.  States he ran out of his medication but would like to restart it.  Has been off of it for at least 30 days.  States it did help        Chronic pain   Following with pain management in [facility].  States he would like to see someone else for pain management after the pain management provider told him just to lose weight and that his diet is poor.  PCP prescribes gabapentin  He is not receiving any medications from pain management office  Lumbar radiculopathy and osteoarthritis of the spine  Also takes ibuprofen at night        Reports he is having Memory issues and brain fog that started a few months ago.   He is on gabapentin 300 mg 4 times a day      States he wakes up in the middle of the night panicked, cannot breathe, heart is jumping out of my chest.  Does have sleep apnea, but has not worn CPAP for years after he lost weight due to gastric bypass.  He is back to his presurgical weight.  No longer has a CPAP at home.  Does snore, does wake up groggy like he did not sleep at all and with a morning headache.  Is tired through the day.        He is legally blind.   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

## 2024-05-30 DIAGNOSIS — M54.16 LUMBAR RADICULOPATHY, CHRONIC: Primary | ICD-10-CM

## 2024-05-30 RX ORDER — TIZANIDINE 2 MG/1
2 TABLET ORAL 3 TIMES DAILY PRN
Qty: 270 TABLET | Refills: 1 | Status: SHIPPED | OUTPATIENT
Start: 2024-05-30

## 2024-05-30 NOTE — TELEPHONE ENCOUNTER
calculated using previous equations.  The CKD-EPI equation is less accurate in patients with extremes of muscle mass, extra-renal   metabolism of creatine, excessive creatine ingestion, or following therapy that affects   renal tubular secretion.       GFR    Date Value Ref Range Status   10/31/2019 >60 >60 Final     Comment:     Chronic Kidney Disease: less than 60 ml/min/1.73 sq.m.          Kidney Failure: less than 15 ml/min/1.73 sq.m.  Results valid for patients 18 years and older.       Albumin/Globulin Ratio   Date Value Ref Range Status   10/31/2019 1.6 1.1 - 2.2 Final     Globulin   Date Value Ref Range Status   10/31/2019 2.8 g/dL Final       Lipids:    Cholesterol, Total   Date Value Ref Range Status   03/27/2018 119 0 - 199 mg/dL Final     Triglycerides   Date Value Ref Range Status   03/27/2018 163 (H) 0 - 150 mg/dL Final     HDL   Date Value Ref Range Status   04/11/2023 47 >40 MG/DL Final     VLDL Cholesterol Calculated   Date Value Ref Range Status   03/27/2018 33 Not Established mg/dL Final       A1C:    Hemoglobin A1C   Date Value Ref Range Status   03/27/2018 5.4 See comment % Final     Comment:     Comment:  Diagnosis of Diabetes: > or = 6.5%  Increased risk of diabetes (Prediabetes): 5.7-6.4%  Glycemic Control: Nonpregnant Adults: <7.0%                    Pregnant: <6.0%           TSH:    TSH   Date Value Ref Range Status   03/27/2018 2.34 0.27 - 4.20 uIU/mL Final     TSH, High Sensitivity   Date Value Ref Range Status   04/08/2011 2.310 0.55 - 4.78 uIu/ml Final       UA:  Color, UA   Date Value Ref Range Status   11/07/2010 YELLOW UYELL Final     Clarity, UA   Date Value Ref Range Status   11/07/2010 CLEAR CLEAR Final     Bilirubin Urine   Date Value Ref Range Status   11/07/2010 NEGATIVE NEG MG/DL Final     Ketones, Urine   Date Value Ref Range Status   11/07/2010 NEGATIVE NEG MG/DL Final     Blood, Urine   Date Value Ref Range Status   11/07/2010 NEGATIVE NEG Final

## 2024-06-06 NOTE — TELEPHONE ENCOUNTER
Last appointment: 5/15/2024   Next Appointment: 6/12/2024     Allergies:  No Known Allergies      Recent Vitals:  Wt Readings from Last 3 Encounters:   05/15/24 (!) 148.8 kg (328 lb)   04/16/24 (!) 151 kg (332 lb 12.8 oz)   03/12/24 (!) 152.7 kg (336 lb 9.6 oz)     Ht Readings from Last 3 Encounters:   05/15/24 1.854 m (6' 1\")   04/16/24 1.854 m (6' 1\")   03/12/24 1.854 m (6' 1\")     BP Readings from Last 3 Encounters:   05/15/24 (!) 134/96   04/16/24 136/84   03/12/24 (!) 136/96     BMI Readings from Last 3 Encounters:   05/15/24 43.27 kg/m²   04/16/24 43.91 kg/m²   03/12/24 44.41 kg/m²       Recent Labs__________________________________________________________________________    Renal:   Creatinine   Date Value Ref Range Status   04/11/2023 0.7 (L) 0.9 - 1.3 MG/DL Final     BUN   Date Value Ref Range Status   04/11/2023 14 6 - 23 MG/DL Final     Sodium   Date Value Ref Range Status   04/11/2023 141 135 - 145 MMOL/L Final     Potassium   Date Value Ref Range Status   04/11/2023 4.4 3.5 - 5.1 MMOL/L Final     Chloride   Date Value Ref Range Status   04/11/2023 106 99 - 110 mMol/L Final     CO2   Date Value Ref Range Status   04/11/2023 24 21 - 32 MMOL/L Final       BMP:    Sodium   Date Value Ref Range Status   04/11/2023 141 135 - 145 MMOL/L Final     Potassium   Date Value Ref Range Status   04/11/2023 4.4 3.5 - 5.1 MMOL/L Final     Chloride   Date Value Ref Range Status   04/11/2023 106 99 - 110 mMol/L Final     CO2   Date Value Ref Range Status   04/11/2023 24 21 - 32 MMOL/L Final     BUN   Date Value Ref Range Status   04/11/2023 14 6 - 23 MG/DL Final     Creatinine   Date Value Ref Range Status   04/11/2023 0.7 (L) 0.9 - 1.3 MG/DL Final     Glucose   Date Value Ref Range Status   04/11/2023 106 (H) 70 - 99 MG/DL Final     Calcium   Date Value Ref Range Status   04/11/2023 8.5 8.3 - 10.6 MG/DL Final     Est, Glom Filt Rate   Date Value Ref Range Status   04/11/2023 >60 >60 mL/min/1.73m2 Final     Comment:

## 2024-06-07 RX ORDER — LISINOPRIL 5 MG/1
TABLET ORAL
Qty: 270 TABLET | Refills: 3 | Status: SHIPPED | OUTPATIENT
Start: 2024-06-07

## 2024-06-12 ENCOUNTER — OFFICE VISIT (OUTPATIENT)
Dept: FAMILY MEDICINE CLINIC | Age: 64
End: 2024-06-12
Payer: MEDICARE

## 2024-06-12 ENCOUNTER — INITIAL CONSULT (OUTPATIENT)
Dept: CARDIOLOGY CLINIC | Age: 64
End: 2024-06-12
Payer: MEDICARE

## 2024-06-12 VITALS
BODY MASS INDEX: 41.75 KG/M2 | WEIGHT: 315 LBS | HEART RATE: 91 BPM | DIASTOLIC BLOOD PRESSURE: 82 MMHG | HEIGHT: 73 IN | SYSTOLIC BLOOD PRESSURE: 126 MMHG

## 2024-06-12 VITALS
OXYGEN SATURATION: 94 % | WEIGHT: 315 LBS | SYSTOLIC BLOOD PRESSURE: 140 MMHG | HEART RATE: 87 BPM | BODY MASS INDEX: 41.75 KG/M2 | HEIGHT: 73 IN | DIASTOLIC BLOOD PRESSURE: 88 MMHG

## 2024-06-12 DIAGNOSIS — E78.2 MIXED HYPERLIPIDEMIA: ICD-10-CM

## 2024-06-12 DIAGNOSIS — I10 ESSENTIAL HYPERTENSION: ICD-10-CM

## 2024-06-12 DIAGNOSIS — G47.33 OSA (OBSTRUCTIVE SLEEP APNEA): ICD-10-CM

## 2024-06-12 DIAGNOSIS — F41.9 ANXIETY: Primary | ICD-10-CM

## 2024-06-12 DIAGNOSIS — M54.16 LUMBAR RADICULOPATHY: ICD-10-CM

## 2024-06-12 DIAGNOSIS — R42 LIGHTHEADEDNESS: ICD-10-CM

## 2024-06-12 DIAGNOSIS — E66.01 CLASS 3 SEVERE OBESITY DUE TO EXCESS CALORIES WITH SERIOUS COMORBIDITY AND BODY MASS INDEX (BMI) OF 40.0 TO 44.9 IN ADULT (HCC): ICD-10-CM

## 2024-06-12 DIAGNOSIS — Z91.81 AT RISK FOR FALLS: ICD-10-CM

## 2024-06-12 DIAGNOSIS — K76.0 FATTY LIVER: ICD-10-CM

## 2024-06-12 DIAGNOSIS — R06.02 SHORTNESS OF BREATH: ICD-10-CM

## 2024-06-12 DIAGNOSIS — R73.03 PREDIABETES: ICD-10-CM

## 2024-06-12 DIAGNOSIS — R42 DIZZINESS AND GIDDINESS: ICD-10-CM

## 2024-06-12 DIAGNOSIS — I10 ESSENTIAL HYPERTENSION: Primary | ICD-10-CM

## 2024-06-12 PROCEDURE — 36415 COLL VENOUS BLD VENIPUNCTURE: CPT | Performed by: NURSE PRACTITIONER

## 2024-06-12 PROCEDURE — 99204 OFFICE O/P NEW MOD 45 MIN: CPT | Performed by: INTERNAL MEDICINE

## 2024-06-12 PROCEDURE — 99214 OFFICE O/P EST MOD 30 MIN: CPT | Performed by: NURSE PRACTITIONER

## 2024-06-12 PROCEDURE — 3079F DIAST BP 80-89 MM HG: CPT | Performed by: NURSE PRACTITIONER

## 2024-06-12 PROCEDURE — 3077F SYST BP >= 140 MM HG: CPT | Performed by: NURSE PRACTITIONER

## 2024-06-12 PROCEDURE — 93000 ELECTROCARDIOGRAM COMPLETE: CPT | Performed by: INTERNAL MEDICINE

## 2024-06-12 PROCEDURE — 3074F SYST BP LT 130 MM HG: CPT | Performed by: INTERNAL MEDICINE

## 2024-06-12 PROCEDURE — 3079F DIAST BP 80-89 MM HG: CPT | Performed by: INTERNAL MEDICINE

## 2024-06-12 RX ORDER — GLUCOSAMINE HCL/CHONDROITIN SU 500-400 MG
CAPSULE ORAL
Qty: 100 STRIP | Refills: 0 | Status: SHIPPED | OUTPATIENT
Start: 2024-06-12

## 2024-06-12 RX ORDER — BLOOD-GLUCOSE METER
1 KIT MISCELLANEOUS DAILY
Qty: 1 KIT | Refills: 0 | Status: SHIPPED | OUTPATIENT
Start: 2024-06-12

## 2024-06-12 RX ORDER — LANCETS 30 GAUGE
1 EACH MISCELLANEOUS DAILY
Qty: 100 EACH | Refills: 5 | Status: SHIPPED | OUTPATIENT
Start: 2024-06-12

## 2024-06-12 RX ORDER — LISINOPRIL 5 MG/1
5 TABLET ORAL DAILY
Qty: 90 TABLET | Refills: 1 | Status: SHIPPED | OUTPATIENT
Start: 2024-06-12

## 2024-06-12 RX ORDER — ESCITALOPRAM OXALATE 5 MG/1
5 TABLET ORAL DAILY
Qty: 90 TABLET | Refills: 0 | Status: SHIPPED | OUTPATIENT
Start: 2024-06-12

## 2024-06-12 NOTE — PROGRESS NOTES
2024     Jayson Quintanilla (:  1960) is a 64 y.o. male, here for evaluation of the following medical concerns:      F/u multiple   Wife with him   Blind and [place], wears aids    No labs since 2018      Prediabetes diagnosis on chart without A1c greater than 5.7 in the [facility] record at least  2018 A1c was 5.4  States Prev on Metformin 500 BID but stopped in April due to trying to get glp1....   Does not check glucose at home.   No supplies       HTN   Scheduled for cardio consult today   Increased lisin last visit   Better since starting lexapro      High anxiety   Lexapro 5mg daily started one month ago   States it has helped with his BP and his high heart rate and anxiety  Wants to leave dose the same.  Denies side effects.  States that he is doing so much better  Denies suicidal self harm thought plan idea  Sleep is doing so much better without panic in the middle of the night or through the day.  He did not schedule sleep study due to improvement of that with Lexapro.        Last visit.   Ref sent to cardio -1:30 PM consult scheduled for today  Dr moe pain management - saw yesterday. Choice of pills or injection. Has not decided yet. Left him on the rosetta for now   Sleep center - not seen         3 weeks ago, states he fell face first.  States he doesn't remember what he was doing. No alcohol use in four months. Refused ambulance and ER. No one witnessed this.   He is on gabapentin, blind and hard of hearing   States he gets lightheaded sometimes   States he is drinking at least 60 oz water per day   Both episodes inside, not in heat   States its when he stands up, gets lightheadd   He is not sure when this started  Wife states blood pressure after these episodes is \" high\"    Chronic back pain with sciatica and weakness in his legs.   Sees PM             Review of Systems    Prior to Visit Medications    Medication Sig Taking? Authorizing Provider   glucose monitoring kit 1 kit by Does not apply

## 2024-06-12 NOTE — PROGRESS NOTES
Gastrointestinal: No abdominal pain, appetite loss, blood in stools, constipation, diarrhea or heartburn  Genitourinary: No dysuria, trouble voiding, or hematuria  Musculoskeletal:  denies any new  joint aches , or pain   Integumentary: No rash or pruritis  Neurological: No TIA or stroke symptoms  Psychiatric: No anxiety or depression  Endocrine: No malaise, fatigue or temperature intolerance  Hematologic/Lymphatic: No bleeding problems, blood clots or swollen lymph nodes  Allergic/Immunologic: No nasal congestion or hives        Objective:      Physical Exam:    /82 (Site: Left Upper Arm, Position: Sitting, Cuff Size: Medium Adult)   Pulse 91   Ht 1.854 m (6' 1\")   Wt (!) 150.6 kg (332 lb)   BMI 43.80 kg/m²   Wt Readings from Last 3 Encounters:   06/12/24 (!) 150.6 kg (332 lb)   06/12/24 (!) 148.4 kg (327 lb 3.2 oz)   05/15/24 (!) 148.8 kg (328 lb)     Body mass index is 43.8 kg/m².  Vitals:    06/12/24 1330   BP: 126/82   Pulse: 91        General Appearance and Constitutional: Conversant, Well developed, Well nourished, No acute distress, Non-toxic appearance.   HEENT:  Normocephalic, Atraumatic, Bilateral external ears normal, Oropharynx moist, No oral exudates,   Nose normal.   Neck- Normal range of motion, No tenderness, Supple  Eyes:  EOMI, Conjunctiva normal, No discharge.   Respiratory:  Normal breath sounds, No respiratory distress, No wheezing, No Rales, No Ronchi.  No chest tenderness.   Cardiovascular: S1-S2, no added heart sounds, No Mumurs appreciated. No gallops, rubs. No Pedal Edema   GI:  Bowel sounds normal, Soft, No tenderness,  :  No costovertebral angle tenderness   Musculoskeletal:  No gross deformities. Back- No tenderness  Integument:  Well hydrated, no rash   Lymphatic:  No lymphadenopathy noted   Neurologic:  Alert & oriented x 3, Normal motor function, normal sensory function, no focal deficits noted   Psychiatric:  Speech and behavior appropriate       Medical decision

## 2024-06-12 NOTE — PATIENT INSTRUCTIONS
We are committed to providing you the best care possible.    If you receive a survey after visiting one of our offices, please take time to share your experience concerning your physician office visit.  These surveys are confidential and no health information about you is shared.    We are eager to improve for you and we are counting on your feedback to help make that happen.      **It is YOUR responsibilty to bring medication bottles and/or updated medication list to EACH APPOINTMENT. This will allow us to better serve you and all your healthcare needs**  Thank you for allowing us to care for you today!   We want to ensure we can follow your treatment plan and we strive to give you the best outcomes and experience possible.   If you ever have a life threatening emergency and call 911 - for an ambulance (EMS)   Our providers can only care for you at:   [facility] or [facility].   Even if you have someone take you or you drive yourself we can only care for you in a [facility] facility. Our providers are not setup at the other healthcare locations!   Please be informed that if you contact our office outside of normal business hours the physician on call cannot help with any scheduling or rescheduling issues, procedure instruction questions or any type of medication issue.    We advise you for any urgent/emergency that you go to the nearest emergency room!    PLEASE CALL OUR OFFICE DURING NORMAL BUSINESS HOURS    Monday - Friday   8 am to 5 pm    [place]: [phone]    [place]: [phone]    [place]:  [phone]

## 2024-06-13 ENCOUNTER — TELEPHONE (OUTPATIENT)
Dept: CARDIOLOGY CLINIC | Age: 64
End: 2024-06-13

## 2024-06-13 LAB
ALBUMIN SERPL-MCNC: 4.1 G/DL (ref 3.4–5)
ALBUMIN/GLOB SERPL: 1.4 {RATIO} (ref 1.1–2.2)
ALP SERPL-CCNC: 111 U/L (ref 40–129)
ALT SERPL-CCNC: 27 U/L (ref 10–40)
ANION GAP SERPL CALCULATED.3IONS-SCNC: 12 MMOL/L (ref 3–16)
AST SERPL-CCNC: 22 U/L (ref 15–37)
BILIRUB SERPL-MCNC: 0.4 MG/DL (ref 0–1)
BUN SERPL-MCNC: 18 MG/DL (ref 7–20)
CALCIUM SERPL-MCNC: 9 MG/DL (ref 8.3–10.6)
CHLORIDE SERPL-SCNC: 106 MMOL/L (ref 99–110)
CHOLEST SERPL-MCNC: 140 MG/DL (ref 0–199)
CO2 SERPL-SCNC: 23 MMOL/L (ref 21–32)
CREAT SERPL-MCNC: 0.8 MG/DL (ref 0.8–1.3)
CREAT UR-MCNC: 229.4 MG/DL (ref 39–259)
EST. AVERAGE GLUCOSE BLD GHB EST-MCNC: 105.4 MG/DL
GFR SERPLBLD CREATININE-BSD FMLA CKD-EPI: >90 ML/MIN/{1.73_M2}
GLUCOSE SERPL-MCNC: 98 MG/DL (ref 70–99)
HBA1C MFR BLD: 5.3 %
HDLC SERPL-MCNC: 49 MG/DL (ref 40–60)
LDL CHOLESTEROL: 59 MG/DL
MICROALBUMIN UR DL<=1MG/L-MCNC: <1.2 MG/DL
MICROALBUMIN/CREAT UR: NORMAL MG/G (ref 0–30)
POTASSIUM SERPL-SCNC: 4.3 MMOL/L (ref 3.5–5.1)
PROT SERPL-MCNC: 7 G/DL (ref 6.4–8.2)
SODIUM SERPL-SCNC: 141 MMOL/L (ref 136–145)
TRIGL SERPL-MCNC: 160 MG/DL (ref 0–150)
VLDLC SERPL CALC-MCNC: 32 MG/DL

## 2024-06-13 NOTE — TELEPHONE ENCOUNTER
Test Ordered:  Treadmill    /  Insurance: [employer]  /  Authorization Status: No Auth Required

## 2024-06-13 NOTE — TELEPHONE ENCOUNTER
Pt's wife called and stated she had missed a call from Christine about her 's testing. Pt wife said she has to go to work , if she misses a call she will call back.

## 2024-06-14 ENCOUNTER — TELEPHONE (OUTPATIENT)
Dept: CARDIOLOGY CLINIC | Age: 64
End: 2024-06-14

## 2024-06-14 NOTE — TELEPHONE ENCOUNTER
Test Ordered: Carotid   /  Insurance: [employer]  /  Authorization Status: Approved:  [facility]# [id number], Exp 9/11/24

## 2024-06-14 NOTE — TELEPHONE ENCOUNTER
Test Ordered: Echo  /  Insurance: [employer] Medicare  /  Authorization Status: Approved:  [id number], Exp 9/11/24

## 2024-07-02 ENCOUNTER — TELEPHONE (OUTPATIENT)
Dept: CARDIOLOGY CLINIC | Age: 64
End: 2024-07-02

## 2024-07-02 DIAGNOSIS — R06.02 SHORTNESS OF BREATH: Primary | ICD-10-CM

## 2024-07-02 DIAGNOSIS — R00.2 PALPITATIONS: ICD-10-CM

## 2024-07-02 NOTE — TELEPHONE ENCOUNTER
Test Ordered: Nuclear   /  Insurance: [employer]  /  Authorization Status: Approved:  [facility]# [id number], Exp 9/29/24

## 2024-07-02 NOTE — TELEPHONE ENCOUNTER
Attempted to call patient to advise that the NM stress test has been moved to noon 7/3/24. There was no answer and the mailbox is full.

## 2024-07-05 ENCOUNTER — TELEPHONE (OUTPATIENT)
Dept: CARDIOLOGY CLINIC | Age: 64
End: 2024-07-05

## 2024-07-05 NOTE — TELEPHONE ENCOUNTER
7/2/24  nm        Stress Combined Conclusion: Findings suggest a moderate risk of cardiac events.    Perfusion Defect: There is a moderate severity left ventricular stress perfusion defect present in the inferoapical segment(s). The defect is consistent with abnormal perfusion in the LAD or RCA territories.    Image quality is good.    ECG: Resting ECG demonstrates normal sinus rhythm.    Stress Test: A pharmacological stress test was performed using regadenoson (Lexiscan). Hemodynamics are adequate for diagnosis. Blood pressure demonstrated a normal response and heart rate demonstrated a normal response to stress. The patient's heart rate recovery was normal.           Abnormal stress MPI with inferior apical segment ischemia noted  Recommend outpatient follow-up to discuss results      7/2/24 CAROTID      No stenosis in the bilateral internal carotid arteries.    Normal antegrade flow involving the bilateral vertebral arteries.    Incidental finding of right neck mass, consistent with a lymph node, measuring 1.3 x 0.8 x 2.8 cm.     7/2/24 ECHO      Left Ventricle: Normal left ventricular systolic function with a visually estimated EF of 50 - 55%. Left ventricle size is normal. Mildly increased wall thickness. Findings consistent with mild concentric hypertrophy. Normal wall motion. Normal diastolic function.    Tricuspid Valve: Mild regurgitation. Normal RVSP. The estimated RVSP is 21 mmHg.    Left Atrium: Left atrium is moderately dilated.    Pericardium: No pericardial effusion.    Image quality is fair.  LM ON VM TO CALL OFFICE FOR RESULTS     PTS WIFE NOTIFIED AND APPT MOVED UP

## 2024-07-08 ENCOUNTER — TELEPHONE (OUTPATIENT)
Dept: FAMILY MEDICINE CLINIC | Age: 64
End: 2024-07-08

## 2024-07-08 DIAGNOSIS — I10 ESSENTIAL HYPERTENSION: ICD-10-CM

## 2024-07-08 DIAGNOSIS — R59.0 CERVICAL LYMPHADENOPATHY: Primary | ICD-10-CM

## 2024-07-08 NOTE — TELEPHONE ENCOUNTER
Pts wife called. She stated pt had imaging - Vascular Duplex Carotoid Bilateral completed on 7/2. Results came back abnormal due to a lymph node. Heart doctor told pt to see PCP ASAP.  Pt would like a morning appt. Please advise

## 2024-07-08 NOTE — TELEPHONE ENCOUNTER
Please let patient and wife know, I would like to for him to complete lab work (non fasting is fine) and CT neck to look at the lymph nodes closer and then we can schedule appointment to go over all the results.

## 2024-07-08 NOTE — TELEPHONE ENCOUNTER
Jayson heart test came back abnormal. The heart doctor said make asap  Patient called and needs an appointment, does patient need to be seen by Cardiology or by PCP.  Please advise.

## 2024-07-16 ENCOUNTER — HOSPITAL ENCOUNTER (OUTPATIENT)
Age: 64
Discharge: HOME | End: 2024-07-16
Attending: FAMILY MEDICINE
Payer: MEDICARE

## 2024-07-16 ENCOUNTER — HOSPITAL ENCOUNTER (OUTPATIENT)
Dept: CT IMAGING | Age: 64
Discharge: HOME | End: 2024-07-16
Attending: FAMILY MEDICINE
Payer: MEDICARE

## 2024-07-16 DIAGNOSIS — R59.0 CERVICAL LYMPHADENOPATHY: ICD-10-CM

## 2024-07-16 DIAGNOSIS — I10 ESSENTIAL HYPERTENSION: ICD-10-CM

## 2024-07-16 LAB
ALBUMIN SERPL-MCNC: 3.9 GM/DL (ref 3.4–5)
ALP BLD-CCNC: 99 IU/L (ref 40–128)
ALT SERPL-CCNC: 22 U/L (ref 10–40)
ANION GAP SERPL CALCULATED.3IONS-SCNC: 11 MMOL/L (ref 7–16)
AST SERPL-CCNC: 18 IU/L (ref 15–37)
BILIRUB SERPL-MCNC: 0.4 MG/DL (ref 0–1)
BUN SERPL-MCNC: 17 MG/DL (ref 6–23)
CALCIUM SERPL-MCNC: 8.6 MG/DL (ref 8.3–10.6)
CHLORIDE BLD-SCNC: 105 MMOL/L (ref 99–110)
CO2: 23 MMOL/L (ref 21–32)
CREAT SERPL-MCNC: 0.7 MG/DL (ref 0.9–1.3)
GFR, ESTIMATED: >90 ML/MIN/1.73M2
GLUCOSE SERPL-MCNC: 112 MG/DL (ref 70–99)
HCT VFR BLD CALC: 41.5 % (ref 42–52)
HEMOGLOBIN: 13.4 GM/DL (ref 13.5–18)
MCH RBC QN AUTO: 29.6 PG (ref 27–31)
MCHC RBC AUTO-ENTMCNC: 32.3 % (ref 32–36)
MCV RBC AUTO: 91.8 FL (ref 78–100)
PDW BLD-RTO: 13.7 % (ref 11.7–14.9)
PLATELET # BLD: 204 K/CU MM (ref 140–440)
PMV BLD AUTO: 10.3 FL (ref 7.5–11.1)
POTASSIUM SERPL-SCNC: 4.9 MMOL/L (ref 3.5–5.1)
RBC # BLD: 4.52 M/CU MM (ref 4.6–6.2)
SED RATE, AUTOMATED: 3 MM/HR (ref 0–20)
SODIUM BLD-SCNC: 139 MMOL/L (ref 135–145)
TOTAL PROTEIN: 7.3 GM/DL (ref 6.4–8.2)
TSH SERPL DL<=0.005 MIU/L-ACNC: 2.29 UIU/ML (ref 0.27–4.2)
WBC # BLD: 5.6 K/CU MM (ref 4–10.5)

## 2024-07-16 PROCEDURE — 70491 CT SOFT TISSUE NECK W/DYE: CPT

## 2024-07-16 PROCEDURE — 80053 COMPREHEN METABOLIC PANEL: CPT

## 2024-07-16 PROCEDURE — 85652 RBC SED RATE AUTOMATED: CPT

## 2024-07-16 PROCEDURE — 6360000004 HC RX CONTRAST MEDICATION: Performed by: FAMILY MEDICINE

## 2024-07-16 PROCEDURE — 2580000003 HC RX 258: Performed by: FAMILY MEDICINE

## 2024-07-16 PROCEDURE — 84443 ASSAY THYROID STIM HORMONE: CPT

## 2024-07-16 PROCEDURE — 85027 COMPLETE CBC AUTOMATED: CPT

## 2024-07-16 RX ORDER — SODIUM CHLORIDE 9 MG/ML
10 INJECTION, SOLUTION INTRAMUSCULAR; INTRAVENOUS; SUBCUTANEOUS PRN
Status: DISCONTINUED | OUTPATIENT
Start: 2024-07-16 | End: 2024-07-17 | Stop reason: HOSPADM

## 2024-07-16 RX ADMIN — SODIUM CHLORIDE, PRESERVATIVE FREE 10 ML: 5 INJECTION INTRAVENOUS at 09:21

## 2024-07-16 RX ADMIN — IOPAMIDOL 75 ML: 755 INJECTION, SOLUTION INTRAVENOUS at 09:25

## 2024-07-17 ENCOUNTER — OFFICE VISIT (OUTPATIENT)
Dept: CARDIOLOGY CLINIC | Age: 64
End: 2024-07-17
Payer: MEDICARE

## 2024-07-17 VITALS
HEART RATE: 76 BPM | BODY MASS INDEX: 41.75 KG/M2 | DIASTOLIC BLOOD PRESSURE: 62 MMHG | HEIGHT: 73 IN | WEIGHT: 315 LBS | SYSTOLIC BLOOD PRESSURE: 120 MMHG

## 2024-07-17 DIAGNOSIS — R06.02 SHORTNESS OF BREATH: ICD-10-CM

## 2024-07-17 DIAGNOSIS — I10 ESSENTIAL HYPERTENSION: ICD-10-CM

## 2024-07-17 DIAGNOSIS — R94.39 ABNORMAL STRESS TEST: Primary | ICD-10-CM

## 2024-07-17 DIAGNOSIS — E78.5 DYSLIPIDEMIA: ICD-10-CM

## 2024-07-17 DIAGNOSIS — R42 DIZZINESS AND GIDDINESS: ICD-10-CM

## 2024-07-17 PROCEDURE — 3078F DIAST BP <80 MM HG: CPT | Performed by: INTERNAL MEDICINE

## 2024-07-17 PROCEDURE — 99214 OFFICE O/P EST MOD 30 MIN: CPT | Performed by: INTERNAL MEDICINE

## 2024-07-17 PROCEDURE — 3074F SYST BP LT 130 MM HG: CPT | Performed by: INTERNAL MEDICINE

## 2024-07-17 RX ORDER — ASPIRIN 81 MG/1
81 TABLET ORAL DAILY
Qty: 90 TABLET | Refills: 0 | Status: SHIPPED | OUTPATIENT
Start: 2024-07-17

## 2024-07-17 RX ORDER — ROSUVASTATIN CALCIUM 20 MG/1
20 TABLET, COATED ORAL DAILY
Qty: 90 TABLET | Refills: 1 | Status: SHIPPED | OUTPATIENT
Start: 2024-07-17

## 2024-07-17 RX ORDER — NITROGLYCERIN 0.4 MG/1
0.4 TABLET SUBLINGUAL EVERY 5 MIN PRN
Qty: 25 TABLET | Refills: 3 | Status: SHIPPED | OUTPATIENT
Start: 2024-07-17

## 2024-07-30 ENCOUNTER — TELEPHONE (OUTPATIENT)
Dept: CARDIOLOGY CLINIC | Age: 64
End: 2024-07-30

## 2024-07-31 ENCOUNTER — TELEPHONE (OUTPATIENT)
Dept: CARDIOLOGY CLINIC | Age: 64
End: 2024-07-31

## 2024-07-31 DIAGNOSIS — Z01.810 PRE-OPERATIVE CARDIOVASCULAR EXAMINATION: ICD-10-CM

## 2024-07-31 DIAGNOSIS — R94.39 ABNORMAL NUCLEAR STRESS TEST: ICD-10-CM

## 2024-07-31 DIAGNOSIS — R94.39 ABNORMAL STRESS TEST: Primary | ICD-10-CM

## 2024-08-01 ENCOUNTER — TELEPHONE (OUTPATIENT)
Dept: CARDIOLOGY CLINIC | Age: 64
End: 2024-08-01

## 2024-08-05 ENCOUNTER — TELEPHONE (OUTPATIENT)
Dept: CARDIOLOGY CLINIC | Age: 64
End: 2024-08-05

## 2024-08-05 NOTE — TELEPHONE ENCOUNTER
[facility]     Dr. CHRISTIANO Yip     LEFT HEART CATHETERIZATION WITH POSSIBLE PERCUTANEOUS CORONARY INTERVENTION    Patient Name: Jayson Quintanilla   : 1960  MRN# [id number]    Date of Procedure: 24 Time: 1pm Arrival Time: 11am    The catheterization and angiogram are usually outpatient procedures, however if stenting is needed you may need to stay overnight. You will need to arrive at the hospital two hours before the procedure.  You will go to registration in the main lobby.  You will need to arrange for someone to drive you home.      HOSPITAL:  [facility]      X   If you have received orders for blood work and or a chest x-ray, please have         them done on [facility]           [facility], or [facility].     X Please do not have anything by mouth after midnight prior to or 8 hours before   the procedure.    X You may take your medications with a sip of water in the morning of your               procedure or take them with you to the hospital          X If you take Viagra (Sildenafil) or Cialis (Tadalafil) you will need to hold it for 3 days before your procedure.

## 2024-08-05 NOTE — TELEPHONE ENCOUNTER
Attemped to contact pt again( 3rd time) w/ procedure date/time. Unable to leave message mailbox full. Same phone number listed for wife.

## 2024-08-08 ENCOUNTER — TELEPHONE (OUTPATIENT)
Dept: CARDIOLOGY CLINIC | Age: 64
End: 2024-08-08

## 2024-08-08 NOTE — TELEPHONE ENCOUNTER
Test Ordered: LHC   /  Insurance: [employer]  /  Authorization Status: Approved:  [facility]# [id number], Exp 11/5/24

## 2024-08-08 NOTE — TELEPHONE ENCOUNTER
Pts spouse called regarding procedure date possibly needing rescheduled. She will check with her work and call the office back to confirm or reschedule.

## 2024-08-08 NOTE — TELEPHONE ENCOUNTER
Pt needs to talk to sunny she just got off the phone with her   She said they can do the procedure on Monday 12th

## 2024-08-10 ENCOUNTER — HOSPITAL ENCOUNTER (OUTPATIENT)
Age: 64
Discharge: HOME | End: 2024-08-10
Payer: MEDICARE

## 2024-08-10 ENCOUNTER — HOSPITAL ENCOUNTER (OUTPATIENT)
Dept: GENERAL RADIOLOGY | Age: 64
Discharge: HOME | End: 2024-08-10
Payer: MEDICARE

## 2024-08-10 DIAGNOSIS — Z01.810 PRE-OPERATIVE CARDIOVASCULAR EXAMINATION: ICD-10-CM

## 2024-08-10 LAB
ABO/RH: NORMAL
ANION GAP SERPL CALCULATED.3IONS-SCNC: 11 MMOL/L (ref 7–16)
ANTIBODY SCREEN: NEGATIVE
BUN SERPL-MCNC: 15 MG/DL (ref 6–23)
CALCIUM SERPL-MCNC: 8.6 MG/DL (ref 8.3–10.6)
CHLORIDE BLD-SCNC: 104 MMOL/L (ref 99–110)
CO2: 26 MMOL/L (ref 21–32)
COMMENT: NORMAL
CREAT SERPL-MCNC: 0.6 MG/DL (ref 0.9–1.3)
GFR, ESTIMATED: >90 ML/MIN/1.73M2
GLUCOSE SERPL-MCNC: 102 MG/DL (ref 70–99)
HCT VFR BLD CALC: 42 % (ref 42–52)
MCH RBC QN AUTO: 29.2 PG (ref 27–31)
MCHC RBC AUTO-ENTMCNC: 31.7 % (ref 32–36)
MCV RBC AUTO: 92.1 FL (ref 78–100)
PDW BLD-RTO: 13.8 % (ref 11.7–14.9)
PLATELET # BLD: 209 K/CU MM (ref 140–440)
PMV BLD AUTO: 9.9 FL (ref 7.5–11.1)
POTASSIUM SERPL-SCNC: 4.4 MMOL/L (ref 3.5–5.1)
RBC # BLD: 4.56 M/CU MM (ref 4.6–6.2)
SODIUM BLD-SCNC: 141 MMOL/L (ref 135–145)
WBC # BLD: 6.2 K/CU MM (ref 4–10.5)

## 2024-08-10 PROCEDURE — 85027 COMPLETE CBC AUTOMATED: CPT

## 2024-08-10 PROCEDURE — 86850 RBC ANTIBODY SCREEN: CPT

## 2024-08-10 PROCEDURE — 36415 COLL VENOUS BLD VENIPUNCTURE: CPT

## 2024-08-10 PROCEDURE — 86901 BLOOD TYPING SEROLOGIC RH(D): CPT

## 2024-08-10 PROCEDURE — 86900 BLOOD TYPING SEROLOGIC ABO: CPT

## 2024-08-10 PROCEDURE — 71046 X-RAY EXAM CHEST 2 VIEWS: CPT

## 2024-08-10 PROCEDURE — 80048 BASIC METABOLIC PNL TOTAL CA: CPT

## 2024-08-12 ENCOUNTER — TELEPHONE (OUTPATIENT)
Dept: FAMILY MEDICINE CLINIC | Age: 64
End: 2024-08-12

## 2024-08-12 ENCOUNTER — HOSPITAL ENCOUNTER (OUTPATIENT)
Age: 64
Setting detail: OUTPATIENT SURGERY
Discharge: HOME | End: 2024-08-12
Attending: INTERNAL MEDICINE | Admitting: INTERNAL MEDICINE
Payer: MEDICARE

## 2024-08-12 VITALS
OXYGEN SATURATION: 90 % | HEIGHT: 73 IN | RESPIRATION RATE: 18 BRPM | HEART RATE: 82 BPM | BODY MASS INDEX: 41.75 KG/M2 | WEIGHT: 315 LBS | DIASTOLIC BLOOD PRESSURE: 70 MMHG | SYSTOLIC BLOOD PRESSURE: 155 MMHG | TEMPERATURE: 97 F

## 2024-08-12 DIAGNOSIS — R94.39 ABNORMAL NUCLEAR STRESS TEST: ICD-10-CM

## 2024-08-12 LAB — ECHO BSA: 2.73 M2

## 2024-08-12 PROCEDURE — 6360000002 HC RX W HCPCS: Performed by: INTERNAL MEDICINE

## 2024-08-12 PROCEDURE — 93458 L HRT ARTERY/VENTRICLE ANGIO: CPT | Performed by: INTERNAL MEDICINE

## 2024-08-12 PROCEDURE — 7100000011 HC PHASE II RECOVERY - ADDTL 15 MIN: Performed by: INTERNAL MEDICINE

## 2024-08-12 PROCEDURE — 7100000010 HC PHASE II RECOVERY - FIRST 15 MIN: Performed by: INTERNAL MEDICINE

## 2024-08-12 PROCEDURE — 2500000003 HC RX 250 WO HCPCS: Performed by: INTERNAL MEDICINE

## 2024-08-12 PROCEDURE — 6360000004 HC RX CONTRAST MEDICATION: Performed by: INTERNAL MEDICINE

## 2024-08-12 PROCEDURE — C1894 INTRO/SHEATH, NON-LASER: HCPCS | Performed by: INTERNAL MEDICINE

## 2024-08-12 PROCEDURE — 99152 MOD SED SAME PHYS/QHP 5/>YRS: CPT | Performed by: INTERNAL MEDICINE

## 2024-08-12 PROCEDURE — 2709999900 HC NON-CHARGEABLE SUPPLY: Performed by: INTERNAL MEDICINE

## 2024-08-12 PROCEDURE — C1769 GUIDE WIRE: HCPCS | Performed by: INTERNAL MEDICINE

## 2024-08-12 RX ORDER — ACETAMINOPHEN 325 MG/1
650 TABLET ORAL EVERY 4 HOURS PRN
Status: DISCONTINUED | OUTPATIENT
Start: 2024-08-12 | End: 2024-08-12 | Stop reason: HOSPADM

## 2024-08-12 RX ORDER — FENTANYL CITRATE 50 UG/ML
INJECTION, SOLUTION INTRAMUSCULAR; INTRAVENOUS PRN
Status: DISCONTINUED | OUTPATIENT
Start: 2024-08-12 | End: 2024-08-12 | Stop reason: HOSPADM

## 2024-08-12 RX ORDER — SODIUM CHLORIDE 0.9 % (FLUSH) 0.9 %
5-40 SYRINGE (ML) INJECTION EVERY 12 HOURS SCHEDULED
Status: DISCONTINUED | OUTPATIENT
Start: 2024-08-12 | End: 2024-08-12 | Stop reason: HOSPADM

## 2024-08-12 RX ORDER — MIDAZOLAM HYDROCHLORIDE 1 MG/ML
INJECTION INTRAMUSCULAR; INTRAVENOUS PRN
Status: DISCONTINUED | OUTPATIENT
Start: 2024-08-12 | End: 2024-08-12 | Stop reason: HOSPADM

## 2024-08-12 RX ORDER — 0.9 % SODIUM CHLORIDE 0.9 %
500 INTRAVENOUS SOLUTION INTRAVENOUS ONCE
Status: DISCONTINUED | OUTPATIENT
Start: 2024-08-12 | End: 2024-08-12 | Stop reason: HOSPADM

## 2024-08-12 RX ORDER — SODIUM CHLORIDE 9 MG/ML
INJECTION, SOLUTION INTRAVENOUS PRN
Status: DISCONTINUED | OUTPATIENT
Start: 2024-08-12 | End: 2024-08-12 | Stop reason: HOSPADM

## 2024-08-12 RX ORDER — SODIUM CHLORIDE 0.9 % (FLUSH) 0.9 %
5-40 SYRINGE (ML) INJECTION PRN
Status: DISCONTINUED | OUTPATIENT
Start: 2024-08-12 | End: 2024-08-12 | Stop reason: HOSPADM

## 2024-08-12 RX ORDER — HEPARIN SODIUM 200 [USP'U]/100ML
INJECTION, SOLUTION INTRAVENOUS PRN
Status: DISCONTINUED | OUTPATIENT
Start: 2024-08-12 | End: 2024-08-12 | Stop reason: HOSPADM

## 2024-08-12 NOTE — H&P
Nando Yip MD                                  CARDIOLOGY  NOTE        Chief Complaint:      CP     Lexiscan stress MPI 7/3/2024      Stress Combined Conclusion: Findings suggest a moderate risk of cardiac events.    Perfusion Defect: There is a moderate severity left ventricular stress perfusion defect present in the inferoapical segment(s). The defect is consistent with abnormal perfusion in the LAD or RCA territories.    Image quality is good.    ECG: Resting ECG demonstrates normal sinus rhythm.    Stress Test: A pharmacological stress test was performed using regadenoson (Lexiscan). Hemodynamics are adequate for diagnosis. Blood pressure demonstrated a normal response and heart rate demonstrated a normal response to stress. The patient's heart rate recovery was normal.     Abnormal stress MPI with inferior apical segment ischemia noted  Recommend outpatient follow-up to discuss results    Echocardiogram 7/2/2024      Left Ventricle: Normal left ventricular systolic function with a visually estimated EF of 50 - 55%. Left ventricle size is normal. Mildly increased wall thickness. Findings consistent with mild concentric hypertrophy. Normal wall motion. Normal diastolic function.    Tricuspid Valve: Mild regurgitation. Normal RVSP. The estimated RVSP is 21 mmHg.    Left Atrium: Left atrium is moderately dilated.    Pericardium: No pericardial effusion.    Image quality is fair.    Duplex ultrasound 7-24        No stenosis in the bilateral internal carotid arteries.    Normal antegrade flow involving the bilateral vertebral arteries.    Incidental finding of right neck mass, consistent with a lymph node, measuring 1.3 x 0.8 x 2.8 cm.      Prior history:     Jayson is a 64 y.o. year old male, legally blind with hearing impairment presents to the clinic to be established for labile blood pressure.  Other history includes diabetes mellitus morbid obesity s/p gastric bypass surgery after which he has regained

## 2024-08-12 NOTE — TELEPHONE ENCOUNTER
Patients wife called and stated that the PCP ordered test for the back of his neck on the right side.Wife states the have not heard back abut the test results. Patient is stating that the pain in the back pf his neck is becoming more constant. Please advise.

## 2024-08-12 NOTE — FLOWSHEET NOTE
Pt to pt  in wheelchair per RN for d/c home in private vehicle with spouse. Right radial site free of bleeding/hematoma at time of d/c

## 2024-08-20 ENCOUNTER — TELEPHONE (OUTPATIENT)
Dept: FAMILY MEDICINE CLINIC | Age: 64
End: 2024-08-20

## 2024-08-20 NOTE — TELEPHONE ENCOUNTER
Julia Dinh RN  RN spoke with the Monique at [facility] , in regards to the order for the walker , Monique states they got the order ,and , and the notes that were didn't clarify what the patient needs, monique states they have meet noted mobility related activity for daily living, she states the patient is willing to pay out of pocket for the walker.    Left message for patient to call back

## 2024-08-20 NOTE — TELEPHONE ENCOUNTER
Patients wife states that he is in a lot of pain in the neck area. The wife is wondering could it be arthritis? What could it be?? The pain last about 2 minutes when it hits

## 2024-08-20 NOTE — TELEPHONE ENCOUNTER
Please let wife know, yes, it also could be the same arthritis in the neck as was seen in the lower back.   Did he get into pain medicine clinic as they can address it.

## 2024-08-20 NOTE — TELEPHONE ENCOUNTER
Pts wife notified and gave verbal understanding. She stated due to her job pt hasn't been seen for a bit at pain management clinic but is going to call and make an appt.

## 2024-09-07 DIAGNOSIS — R73.03 PREDIABETES: ICD-10-CM

## 2024-09-09 RX ORDER — BLOOD SUGAR DIAGNOSTIC
STRIP MISCELLANEOUS
Qty: 100 STRIP | Refills: 3 | Status: SHIPPED | OUTPATIENT
Start: 2024-09-09

## 2024-10-22 RX ORDER — SALICYLIC ACID 40 %
81 ADHESIVE PATCH, MEDICATED TOPICAL DAILY
Qty: 90 TABLET | Refills: 0 | OUTPATIENT
Start: 2024-10-22

## 2024-11-09 DIAGNOSIS — F41.9 ANXIETY: ICD-10-CM

## 2024-11-11 RX ORDER — ESCITALOPRAM OXALATE 5 MG/1
5 TABLET ORAL DAILY
Qty: 90 TABLET | Refills: 0 | Status: SHIPPED | OUTPATIENT
Start: 2024-11-11

## 2024-11-13 ENCOUNTER — TELEPHONE (OUTPATIENT)
Dept: PHARMACY | Facility: CLINIC | Age: 64
End: 2024-11-13

## 2024-11-13 RX ORDER — ROSUVASTATIN CALCIUM 20 MG/1
20 TABLET, COATED ORAL DAILY
Qty: 90 TABLET | Refills: 1 | OUTPATIENT
Start: 2024-11-13

## 2024-11-13 NOTE — TELEPHONE ENCOUNTER
ROSUVASTATIN TAB 20MG  Request refused: Patient needs an appointment     Left message for patient    For Pharmacy Admin Tracking Only    Program: [employer]  CPA in place:  No  Recommendation Provided To: Pharmacy: 1  Intervention Detail: Refill(s) Provided  Intervention Accepted By: Pharmacy: 1  Gap Closed?: No   Time Spent (min): 15   
Lizbeth Case.   Population Health Clinical   [facility]  Toll free: [phone] Option 1

## 2025-01-09 ENCOUNTER — OFFICE VISIT (OUTPATIENT)
Dept: FAMILY MEDICINE CLINIC | Age: 65
End: 2025-01-09
Payer: MEDICARE

## 2025-01-09 VITALS
OXYGEN SATURATION: 97 % | DIASTOLIC BLOOD PRESSURE: 80 MMHG | WEIGHT: 315 LBS | SYSTOLIC BLOOD PRESSURE: 138 MMHG | BODY MASS INDEX: 41.75 KG/M2 | HEIGHT: 73 IN | HEART RATE: 76 BPM

## 2025-01-09 DIAGNOSIS — E11.9 TYPE 2 DIABETES MELLITUS WITHOUT COMPLICATION, WITHOUT LONG-TERM CURRENT USE OF INSULIN (HCC): ICD-10-CM

## 2025-01-09 DIAGNOSIS — F41.9 ANXIETY: ICD-10-CM

## 2025-01-09 DIAGNOSIS — E78.2 MIXED HYPERLIPIDEMIA: ICD-10-CM

## 2025-01-09 DIAGNOSIS — N52.9 ERECTILE DYSFUNCTION, UNSPECIFIED ERECTILE DYSFUNCTION TYPE: ICD-10-CM

## 2025-01-09 DIAGNOSIS — I10 ESSENTIAL HYPERTENSION: Primary | ICD-10-CM

## 2025-01-09 DIAGNOSIS — M54.16 LUMBAR RADICULOPATHY, CHRONIC: ICD-10-CM

## 2025-01-09 PROCEDURE — 99214 OFFICE O/P EST MOD 30 MIN: CPT | Performed by: NURSE PRACTITIONER

## 2025-01-09 PROCEDURE — 3075F SYST BP GE 130 - 139MM HG: CPT | Performed by: NURSE PRACTITIONER

## 2025-01-09 PROCEDURE — 3079F DIAST BP 80-89 MM HG: CPT | Performed by: NURSE PRACTITIONER

## 2025-01-09 RX ORDER — ESCITALOPRAM OXALATE 5 MG/1
5 TABLET ORAL DAILY
Qty: 90 TABLET | Refills: 1 | Status: SHIPPED | OUTPATIENT
Start: 2025-01-09

## 2025-01-09 RX ORDER — TIZANIDINE 2 MG/1
2 TABLET ORAL 3 TIMES DAILY PRN
Qty: 270 TABLET | Refills: 1 | Status: CANCELLED | OUTPATIENT
Start: 2025-01-09

## 2025-01-09 RX ORDER — ALBUTEROL SULFATE 90 UG/1
2 INHALANT RESPIRATORY (INHALATION) EVERY 4 HOURS PRN
COMMUNITY
Start: 2024-10-29 | End: 2025-01-09

## 2025-01-09 SDOH — ECONOMIC STABILITY: FOOD INSECURITY: WITHIN THE PAST 12 MONTHS, THE FOOD YOU BOUGHT JUST DIDN'T LAST AND YOU DIDN'T HAVE MONEY TO GET MORE.: PATIENT DECLINED

## 2025-01-09 SDOH — ECONOMIC STABILITY: FOOD INSECURITY: WITHIN THE PAST 12 MONTHS, YOU WORRIED THAT YOUR FOOD WOULD RUN OUT BEFORE YOU GOT MONEY TO BUY MORE.: PATIENT DECLINED

## 2025-01-09 ASSESSMENT — PATIENT HEALTH QUESTIONNAIRE - PHQ9
1. LITTLE INTEREST OR PLEASURE IN DOING THINGS: NOT AT ALL
2. FEELING DOWN, DEPRESSED OR HOPELESS: NOT AT ALL
SUM OF ALL RESPONSES TO PHQ QUESTIONS 1-9: 0
SUM OF ALL RESPONSES TO PHQ9 QUESTIONS 1 & 2: 0
SUM OF ALL RESPONSES TO PHQ QUESTIONS 1-9: 0

## 2025-01-09 NOTE — PROGRESS NOTES
April due to trying to get glp1....   Does not check glucose at home.   No supplies                 High anxiety   Lexapro 5mg - doing well   States it has helped with his BP and his high heart rate and anxiety  Wants to leave dose the same.  Denies side effects.    Denies suicidal self harm thought plan idea                Chronic back pain with sciatica and weakness in his legs.   Sees PM dr abhi gibbons and rosetta.   Hx of injections.                Prior to Visit Medications    Medication Sig Taking? Authorizing Provider   escitalopram (LEXAPRO) 5 MG tablet Take 1 tablet by mouth daily Yes Gali Mclean, DES - NP   lisinopril (PRINIVIL;ZESTRIL) 5 MG tablet Take 1 tablet by mouth daily Yes Nando Yip MD   tiZANidine (ZANAFLEX) 2 MG tablet TAKE 1 TABLET BY MOUTH 3 TIMES DAILY AS NEEDED (MUSCLE SPASMS). Yes Jaclyn Grande MD   gabapentin (NEURONTIN) 100 MG capsule 3 cap morning, 3 capsules afternoon if needed, and 4 capsules at bedtime Yes Jaclyn Grande MD   Cyanocobalamin (VITAMIN B-12) 1000 MCG/15ML LIQD Take 3 drops by mouth every 7 days.   Yes Jennie Jacobo MD   Multiple Vitamin (MULTIVITAMIN PO) Take 1 tablet by mouth daily.   Yes Jennie Jacobo MD            Social History     Tobacco Use    Smoking status: Never    Smokeless tobacco: Never   Substance Use Topics    Alcohol use: Yes     Alcohol/week: 0.0 standard drinks of alcohol     Comment: rare            Vitals:    01/09/25 0812   BP: 138/80   Site: Left Upper Arm   Position: Sitting   Cuff Size: Large Adult   Pulse: 76   SpO2: 97%   Weight: (!) 152.4 kg (336 lb)   Height: 1.854 m (6' 1\")         Estimated body mass index is 44.33 kg/m² as calculated from the following:    Height as of this encounter: 1.854 m (6' 1\").    Weight as of this encounter: 152.4 kg (336 lb).      Physical Exam  Vitals reviewed.   Constitutional:       General: He is not in acute distress.     Appearance: Normal

## 2025-01-16 ENCOUNTER — E-VISIT (OUTPATIENT)
Dept: FAMILY MEDICINE CLINIC | Age: 65
End: 2025-01-16
Payer: MEDICARE

## 2025-01-16 DIAGNOSIS — J06.9 VIRAL URI: Primary | ICD-10-CM

## 2025-01-16 PROCEDURE — 99421 OL DIG E/M SVC 5-10 MIN: CPT | Performed by: FAMILY MEDICINE

## 2025-01-16 ASSESSMENT — LIFESTYLE VARIABLES: SMOKING_STATUS: NO, I HAVE NEVER SMOKED

## 2025-01-16 NOTE — PROGRESS NOTES
HPI: as per patient provided history  Exam: N/A (electronic visit)  ASSESSMENT/PLAN:  1. Viral URI  Patient's wife reported COVID negative test for both of them for symptoms.     Suspected viral URI as wife has similar symptoms.     Supported care recommended to patient via MyChart        Patient instructed to call the office if worsens, or fails to improve as anticipated.    5-10 minutes were spent on the digital evaluation and management of this patient.ement of this patient.

## 2025-02-06 ENCOUNTER — TELEPHONE (OUTPATIENT)
Dept: FAMILY MEDICINE CLINIC | Age: 65
End: 2025-02-06

## 2025-02-06 DIAGNOSIS — M54.16 LUMBAR RADICULOPATHY, CHRONIC: ICD-10-CM

## 2025-02-06 RX ORDER — TIZANIDINE 2 MG/1
2 TABLET ORAL 3 TIMES DAILY PRN
Qty: 270 TABLET | Refills: 1 | OUTPATIENT
Start: 2025-02-06

## 2025-02-06 RX ORDER — TIZANIDINE 2 MG/1
2 TABLET ORAL 3 TIMES DAILY PRN
Qty: 60 TABLET | Refills: 0 | Status: SHIPPED | OUTPATIENT
Start: 2025-02-06

## 2025-02-06 NOTE — TELEPHONE ENCOUNTER
Let patient know I sent a temporary refill of tizanidine to [facility] [place] while she waits for the pain providers to get back into the office

## 2025-02-06 NOTE — TELEPHONE ENCOUNTER
Dr. [place], pts pain management provider is out sick. Pts wife, Maria Guadalupe had requested a refill of pts Tizanidine and their office stated they don't have a provider there to order a refill. Pt is asking if PCP can refill script due to the circumstances. Maria Guadalupe stated if he doesn't take this she will have to take him to the ER and she can't afford to miss work. Pharmacy [facility]. Please advise

## 2025-04-29 ENCOUNTER — HOSPITAL ENCOUNTER (OUTPATIENT)
Age: 65
Discharge: HOME | End: 2025-04-29
Payer: MEDICARE

## 2025-04-29 ENCOUNTER — OFFICE VISIT (OUTPATIENT)
Dept: FAMILY MEDICINE CLINIC | Age: 65
End: 2025-04-29
Payer: MEDICARE

## 2025-04-29 VITALS
WEIGHT: 315 LBS | BODY MASS INDEX: 44.07 KG/M2 | SYSTOLIC BLOOD PRESSURE: 124 MMHG | OXYGEN SATURATION: 98 % | DIASTOLIC BLOOD PRESSURE: 88 MMHG | HEART RATE: 63 BPM

## 2025-04-29 DIAGNOSIS — R73.03 PREDIABETES: ICD-10-CM

## 2025-04-29 DIAGNOSIS — Z91.81 AT RISK FOR FALL DUE TO COMORBID CONDITION: ICD-10-CM

## 2025-04-29 DIAGNOSIS — I10 ESSENTIAL HYPERTENSION: ICD-10-CM

## 2025-04-29 DIAGNOSIS — E55.9 VITAMIN D DEFICIENCY: ICD-10-CM

## 2025-04-29 DIAGNOSIS — E53.8 VITAMIN B12 DEFICIENCY: ICD-10-CM

## 2025-04-29 DIAGNOSIS — H54.8 LEGALLY BLIND: ICD-10-CM

## 2025-04-29 DIAGNOSIS — M54.16 LUMBAR RADICULOPATHY, CHRONIC: ICD-10-CM

## 2025-04-29 DIAGNOSIS — F41.9 ANXIETY: ICD-10-CM

## 2025-04-29 DIAGNOSIS — E88.810 METABOLIC SYNDROME: ICD-10-CM

## 2025-04-29 DIAGNOSIS — Z00.00 MEDICARE ANNUAL WELLNESS VISIT, SUBSEQUENT: Primary | ICD-10-CM

## 2025-04-29 PROBLEM — R94.39 ABNORMAL NUCLEAR STRESS TEST: Status: RESOLVED | Noted: 2024-07-31 | Resolved: 2025-04-29

## 2025-04-29 PROBLEM — Z97.4 WEARS HEARING AID IN BOTH EARS: Status: ACTIVE | Noted: 2025-04-29

## 2025-04-29 PROBLEM — R45.4 IRRITABILITY: Status: RESOLVED | Noted: 2018-05-08 | Resolved: 2025-04-29

## 2025-04-29 LAB
25(OH)D3 SERPL-MCNC: 26.6 NG/ML (ref 30–150)
ALBUMIN SERPL-MCNC: 3.9 G/DL (ref 3.4–5)
ALBUMIN/GLOB SERPL: 1.3 {RATIO} (ref 1.1–2.2)
ALP SERPL-CCNC: 89 U/L (ref 40–129)
ALT SERPL-CCNC: 29 U/L (ref 10–40)
ANION GAP SERPL CALCULATED.3IONS-SCNC: 12 MMOL/L (ref 9–17)
AST SERPL-CCNC: 26 U/L (ref 15–37)
BILIRUB SERPL-MCNC: 0.5 MG/DL (ref 0–1)
BUN SERPL-MCNC: 12 MG/DL (ref 7–20)
CALCIUM SERPL-MCNC: 8.5 MG/DL (ref 8.3–10.6)
CHLORIDE SERPL-SCNC: 103 MMOL/L (ref 99–110)
CO2 SERPL-SCNC: 24 MMOL/L (ref 21–32)
CREAT SERPL-MCNC: 0.7 MG/DL (ref 0.8–1.3)
CREAT UR-MCNC: 126 MG/DL (ref 39–259)
FOLATE SERPL-MCNC: 23.6 NG/ML (ref 4.8–24.2)
GFR, ESTIMATED: >90 ML/MIN/1.73M2
GLUCOSE SERPL-MCNC: 105 MG/DL (ref 74–99)
HBA1C MFR BLD: 5.6 %
MICROALBUMIN UR DL<=1MG/L-MCNC: <1.2 MG/DL
MICROALBUMIN/CREAT UR: NORMAL MG/G (ref 0–30)
POTASSIUM SERPL-SCNC: 4.1 MMOL/L (ref 3.5–5.1)
PROT SERPL-MCNC: 6.9 G/DL (ref 6.4–8.2)
SODIUM SERPL-SCNC: 139 MMOL/L (ref 136–145)
VIT B12 SERPL-MCNC: 365 PG/ML (ref 211–911)

## 2025-04-29 PROCEDURE — 80053 COMPREHEN METABOLIC PANEL: CPT

## 2025-04-29 PROCEDURE — 82746 ASSAY OF FOLIC ACID SERUM: CPT

## 2025-04-29 PROCEDURE — G0439 PPPS, SUBSEQ VISIT: HCPCS | Performed by: FAMILY MEDICINE

## 2025-04-29 PROCEDURE — 82607 VITAMIN B-12: CPT

## 2025-04-29 PROCEDURE — 82306 VITAMIN D 25 HYDROXY: CPT

## 2025-04-29 PROCEDURE — 99214 OFFICE O/P EST MOD 30 MIN: CPT | Performed by: FAMILY MEDICINE

## 2025-04-29 PROCEDURE — 83036 HEMOGLOBIN GLYCOSYLATED A1C: CPT | Performed by: FAMILY MEDICINE

## 2025-04-29 PROCEDURE — 3074F SYST BP LT 130 MM HG: CPT | Performed by: FAMILY MEDICINE

## 2025-04-29 PROCEDURE — 3079F DIAST BP 80-89 MM HG: CPT | Performed by: FAMILY MEDICINE

## 2025-04-29 RX ORDER — LISINOPRIL 5 MG/1
5 TABLET ORAL NIGHTLY
Qty: 90 TABLET | Refills: 1
Start: 2025-04-29

## 2025-04-29 RX ORDER — ESCITALOPRAM OXALATE 5 MG/1
10 TABLET ORAL EVERY MORNING
Qty: 180 TABLET | Refills: 3 | Status: SHIPPED | OUTPATIENT
Start: 2025-04-29

## 2025-04-29 SDOH — HEALTH STABILITY: PHYSICAL HEALTH: ON AVERAGE, HOW MANY DAYS PER WEEK DO YOU ENGAGE IN MODERATE TO STRENUOUS EXERCISE (LIKE A BRISK WALK)?: 1 DAY

## 2025-04-29 SDOH — HEALTH STABILITY: PHYSICAL HEALTH: ON AVERAGE, HOW MANY MINUTES DO YOU ENGAGE IN EXERCISE AT THIS LEVEL?: 10 MIN

## 2025-04-29 ASSESSMENT — ANXIETY QUESTIONNAIRES
6. BECOMING EASILY ANNOYED OR IRRITABLE: 3-NEARLY EVERY DAY
3. WORRYING TOO MUCH ABOUT DIFFERENT THINGS: 3-NEARLY EVERY DAY
1. FEELING NERVOUS, ANXIOUS, OR ON EDGE: NEARLY EVERY DAY
2. NOT BEING ABLE TO STOP OR CONTROL WORRYING: 3-NEARLY EVERY DAY
GAD7 TOTAL SCORE: 21
5. BEING SO RESTLESS THAT IT IS HARD TO SIT STILL: 3-NEARLY EVERY DAY
7. FEELING AFRAID AS IF SOMETHING AWFUL MIGHT HAPPEN: 3-NEARLY EVERY DAY
4. TROUBLE RELAXING: 3-NEARLY EVERY DAY

## 2025-04-29 ASSESSMENT — PATIENT HEALTH QUESTIONNAIRE - PHQ9
SUM OF ALL RESPONSES TO PHQ QUESTIONS 1-9: 0
2. FEELING DOWN, DEPRESSED OR HOPELESS: NOT AT ALL
1. LITTLE INTEREST OR PLEASURE IN DOING THINGS: NOT AT ALL
SUM OF ALL RESPONSES TO PHQ QUESTIONS 1-9: 0

## 2025-04-29 ASSESSMENT — LIFESTYLE VARIABLES
HOW OFTEN DO YOU HAVE A DRINK CONTAINING ALCOHOL: 2
HOW OFTEN DO YOU HAVE SIX OR MORE DRINKS ON ONE OCCASION: 1
HOW MANY STANDARD DRINKS CONTAINING ALCOHOL DO YOU HAVE ON A TYPICAL DAY: 1
HOW MANY STANDARD DRINKS CONTAINING ALCOHOL DO YOU HAVE ON A TYPICAL DAY: 1 OR 2
HOW OFTEN DO YOU HAVE A DRINK CONTAINING ALCOHOL: MONTHLY OR LESS

## 2025-04-29 NOTE — PROGRESS NOTES
Medicare Annual Wellness Visit    Jayson Quintanilla is here for Medicare AWV, Anxiety (Would like to discuss medications increase for anxiety. ), and Durable Medical Equipment (Would like to have a lift chair recliner)    Assessment & Plan   Medicare annual wellness visit, subsequent  Essential hypertension  Assessment & Plan:  Stable and controlled on lisionpril. Will continue to monitor. Patient will also call cardiology team as patient report some chest discomfort both at rest and rare with activity with a normal cath 2024. Wife will change lisinopril to nighttime to see if it helps patient relax in the evening  Orders:  -     Albumin/Creatinine Ratio, Urine  -     Comprehensive Metabolic Panel; Future  Anxiety  Assessment & Plan:  Stress with recent family issues. Trial increase in lexapro to 10mg daily.  Pt will notify if moods worsen    Orders:  -     escitalopram (LEXAPRO) 5 MG tablet; Take 2 tablets by mouth every morning Dose increase as of 4/29/25. Patient will call when due, Disp-180 tablet, R-3Normal  Metabolic syndrome  -     POCT glycosylated hemoglobin (Hb A1C)  Prediabetes  -     POCT glycosylated hemoglobin (Hb A1C)  Legally blind  -     Lift Chair MISC; Starting Tue 4/29/2025, Disp-1 each, R-0, Print  At risk for fall due to comorbid condition  -     Lift Chair MISC; Starting Tue 4/29/2025, Disp-1 each, R-0, Print  -     [facility]  Lumbar radiculopathy, chronic  Assessment & Plan:  Following with pain specialist every 3 monthsand taking 600mg tid with tizanidine 1 tablet tid to help with functioning but still has a fall risk due to legally blindness.  Lift chair Rx given to wife to help   Orders:  -     [facility]  Vitamin B12 deficiency  -     Vitamin B12 & Folate; Future  Vitamin D deficiency  -     Vitamin D 25 Hydroxy; Future       Instructed wife if moods worsens significantly for patient that she is fearful of patient's behavior to call

## 2025-04-29 NOTE — ASSESSMENT & PLAN NOTE
Following with pain specialist every 3 monthsand taking 600mg tid with tizanidine 1 tablet tid to help with functioning but still has a fall risk due to legally blindness.  Lift chair Rx given to wife to help

## 2025-04-29 NOTE — ASSESSMENT & PLAN NOTE
Stress with recent family issues. Trial increase in lexapro to 10mg daily.  Pt will notify if moods worsen

## 2025-04-29 NOTE — ASSESSMENT & PLAN NOTE
Stable and controlled on lisionpril. Will continue to monitor. Patient will also call cardiology team as patient report some chest discomfort both at rest and rare with activity with a normal cath 2024. Wife will change lisinopril to nighttime to see if it helps patient relax in the evening

## 2025-06-11 ENCOUNTER — HOSPITAL ENCOUNTER (OUTPATIENT)
Age: 65
Discharge: HOME | End: 2025-06-11
Payer: MEDICARE

## 2025-06-11 ENCOUNTER — HOSPITAL ENCOUNTER (OUTPATIENT)
Dept: GENERAL RADIOLOGY | Age: 65
Discharge: HOME | End: 2025-06-11
Payer: MEDICARE

## 2025-06-11 DIAGNOSIS — M54.16 LUMBAR RADICULOPATHY: ICD-10-CM

## 2025-06-11 DIAGNOSIS — M47.816 LUMBAR SPONDYLOSIS: ICD-10-CM

## 2025-06-11 PROCEDURE — 72100 X-RAY EXAM L-S SPINE 2/3 VWS: CPT

## 2025-06-16 ENCOUNTER — RESULTS FOLLOW-UP (OUTPATIENT)
Dept: FAMILY MEDICINE CLINIC | Age: 65
End: 2025-06-16

## 2025-07-07 ENCOUNTER — TRANSCRIBE ORDERS (OUTPATIENT)
Dept: ADMINISTRATIVE | Age: 65
End: 2025-07-07

## 2025-07-07 DIAGNOSIS — M54.16 LUMBAR RADICULOPATHY: Primary | ICD-10-CM

## 2025-07-23 ENCOUNTER — HOSPITAL ENCOUNTER (OUTPATIENT)
Dept: MRI IMAGING | Age: 65
Discharge: HOME | End: 2025-07-23
Payer: MEDICARE

## 2025-07-23 DIAGNOSIS — M54.16 LUMBAR RADICULOPATHY: ICD-10-CM

## 2025-07-23 PROCEDURE — 72148 MRI LUMBAR SPINE W/O DYE: CPT

## (undated) DEVICE — ANGIOGRAPHY KIT CUST MANIFOLD

## (undated) DEVICE — BAND COMPR L24CM REG CLR PLAS HEMSTAT EXT HK AND LOOP RETEN

## (undated) DEVICE — 260 CM J TIP WIRE .035

## (undated) DEVICE — RADIFOCUS GLIDEWIRE: Brand: GLIDEWIRE

## (undated) DEVICE — GLIDESHEATH SLENDER ACCESS KIT: Brand: GLIDESHEATH SLENDER

## (undated) DEVICE — RADIFOCUS OPTITORQUE ANGIOGRAPHIC CATHETER: Brand: OPTITORQUE

## (undated) DEVICE — Device